# Patient Record
Sex: FEMALE | Race: WHITE | NOT HISPANIC OR LATINO
[De-identification: names, ages, dates, MRNs, and addresses within clinical notes are randomized per-mention and may not be internally consistent; named-entity substitution may affect disease eponyms.]

---

## 2017-05-11 ENCOUNTER — APPOINTMENT (OUTPATIENT)
Dept: OBGYN | Facility: CLINIC | Age: 57
End: 2017-05-11

## 2017-05-11 VITALS
DIASTOLIC BLOOD PRESSURE: 80 MMHG | BODY MASS INDEX: 28.93 KG/M2 | WEIGHT: 180 LBS | HEIGHT: 66 IN | SYSTOLIC BLOOD PRESSURE: 118 MMHG

## 2017-05-11 DIAGNOSIS — Z80.42 FAMILY HISTORY OF MALIGNANT NEOPLASM OF PROSTATE: ICD-10-CM

## 2017-05-11 DIAGNOSIS — Z80.52 FAMILY HISTORY OF MALIGNANT NEOPLASM OF BLADDER: ICD-10-CM

## 2017-05-11 LAB — HEMOCCULT SP1 STL QL: NEGATIVE

## 2017-05-25 ENCOUNTER — RESULT REVIEW (OUTPATIENT)
Age: 57
End: 2017-05-25

## 2018-11-30 ENCOUNTER — RESULT REVIEW (OUTPATIENT)
Age: 58
End: 2018-11-30

## 2018-12-28 ENCOUNTER — APPOINTMENT (OUTPATIENT)
Dept: OBGYN | Facility: CLINIC | Age: 58
End: 2018-12-28
Payer: COMMERCIAL

## 2018-12-28 VITALS
WEIGHT: 178.13 LBS | DIASTOLIC BLOOD PRESSURE: 64 MMHG | BODY MASS INDEX: 28.63 KG/M2 | SYSTOLIC BLOOD PRESSURE: 128 MMHG | HEIGHT: 66 IN | HEART RATE: 80 BPM

## 2018-12-28 DIAGNOSIS — Z01.419 ENCOUNTER FOR GYNECOLOGICAL EXAMINATION (GENERAL) (ROUTINE) W/OUT ABNORMAL FINDINGS: ICD-10-CM

## 2018-12-28 DIAGNOSIS — D25.9 LEIOMYOMA OF UTERUS, UNSPECIFIED: ICD-10-CM

## 2018-12-28 LAB
CARD LOT #: 281
CARD LOT EXP DATE: NORMAL
HEMOCCULT SP1 STL QL: NEGATIVE

## 2018-12-28 PROCEDURE — 82270 OCCULT BLOOD FECES: CPT

## 2018-12-28 PROCEDURE — 99396 PREV VISIT EST AGE 40-64: CPT

## 2019-01-03 ENCOUNTER — APPOINTMENT (OUTPATIENT)
Dept: OBGYN | Facility: CLINIC | Age: 59
End: 2019-01-03
Payer: COMMERCIAL

## 2019-01-03 ENCOUNTER — ASOB RESULT (OUTPATIENT)
Age: 59
End: 2019-01-03

## 2019-01-03 PROCEDURE — 76857 US EXAM PELVIC LIMITED: CPT

## 2019-01-03 PROCEDURE — 76830 TRANSVAGINAL US NON-OB: CPT

## 2020-07-02 ENCOUNTER — APPOINTMENT (OUTPATIENT)
Dept: OBGYN | Facility: CLINIC | Age: 60
End: 2020-07-02
Payer: COMMERCIAL

## 2020-07-02 VITALS
SYSTOLIC BLOOD PRESSURE: 120 MMHG | HEIGHT: 66 IN | DIASTOLIC BLOOD PRESSURE: 66 MMHG | WEIGHT: 180 LBS | BODY MASS INDEX: 28.93 KG/M2

## 2020-07-02 DIAGNOSIS — Z01.419 ENCOUNTER FOR GYNECOLOGICAL EXAMINATION (GENERAL) (ROUTINE) W/OUT ABNORMAL FINDINGS: ICD-10-CM

## 2020-07-02 LAB — HEMOCCULT SP1 STL QL: NEGATIVE

## 2020-07-02 PROCEDURE — 99396 PREV VISIT EST AGE 40-64: CPT

## 2020-07-02 PROCEDURE — 82270 OCCULT BLOOD FECES: CPT

## 2020-07-02 NOTE — HISTORY OF PRESENT ILLNESS
[1 Year Ago] : 1 year ago [Healthy Diet] : a healthy diet [Good] : being in good health [Weight Concerns] : weight concens [___ Servings of Dairy/Day] : [unfilled] servings of dairy foods per day [Regular Exercise] : regular exercise [3 or more times/wk] :  3 or more times per week [Current] : metabolic screening reviewed and current [Last Mammogram ___] : Last Mammogram was [unfilled] [Last Pap ___] : Last cervical pap smear was [unfilled] [Last Bone Density ___] : Last bone density studies [unfilled] [Last Colonoscopy ___] : Last colonoscopy [unfilled] [Performed Within 5 Years] : lipid profile performed within the past five years [Annual Vaginal Sonography ___] : annual vaginal sonograph [unfilled] [Performed Last Year] : thyroid function test performed last year [High LDL] : high LDL cholesterol [Low HDL] : low HDL cholesterol [Obesity] : obesity [Stress] : stress [Tobacco Use] : tobacco use [Osteoporosis Risk Factors] : osteoporosis risk factors [Postmenopausal] : is postmenopausal [Pregnancy History] : pregnancy history: [Total Preg ___] : [unfilled] [AB Induced ___] : elective abortions: [unfilled] [Monogamous (Male Partner)] : is monogamous with a male partner [HPV Vaccine NA Due to Age] : HPV vaccine not available to patient due to age [Dyspareunia] : dyspareunia [Experiencing Menopausal Sxs] : experiencing menopausal symptoms [Currently In Menopause] : currently in menopause [Sexually Active] : is sexually active [Monogamous] : is monogamous [Walking] : walking [Diabetes] : no diabetes [Hypertension] : no hypertension [Illicit Drug Use] : no illicit drug use [Sedentary Lifestyle] : no sedentary lifestyle [FH Cardiovascular Disease] : no family history of cardiovascular disease [Previous Breast Cancer] : no previous breast cancer [Abnormal Cervical Cytology] : no abnormal cervical cytology [HPV Positive] : no positive screening for human papilloma virus [Hormone Therapy] : no hormone therapy [Previous Colon Polyps] : no previous colon polyps [Inflammatory Bowel Disease] : no inflammatory bowel disease [In Utero JOSE ANGEL Exposure] : no diethylstilbestrol (JOSE ANGEL) exposure in utero [de-identified] : menopause early 40s [Hot Flashes] : no hot flashes [Night Sweats] : no night sweats [de-identified] : OCCASIONAL INTERCOURSE [FreeTextEntry8] : SOME VAGINAL DRYNESS, INSOMNIA

## 2020-07-02 NOTE — PHYSICAL EXAM
[Awake] : awake [Alert] : alert [Soft] : soft [Oriented x3] : oriented to person, place, and time [Normal] : cervix [No Bleeding] : there was no active vaginal bleeding [Atrophy] : atrophy [Uterine Adnexae] : were not tender and not enlarged [No Tenderness] : no rectal tenderness [Exam Deferred] : was deferred [RRR, No Murmurs] : RRR, no murmurs [CTAB] : CTAB [Acute Distress] : no acute distress [LAD] : no lymphadenopathy [Goiter] : no goiter [Mass] : no breast mass [Thyroid Nodule] : no thyroid nodule [Nipple Discharge] : no nipple discharge [Axillary LAD] : no axillary lymphadenopathy [Occult Blood] : occult blood test from digital rectal exam was negative [Tender] : non tender

## 2020-07-02 NOTE — REVIEW OF SYSTEMS
[Cold Intolerance] : intolerance to cold [Loss of Hair] : loss of hair [Nl] : Integumentary [Feeling Tired] : feeling tired [Arthralgias] : arthralgias [Joint Stiffness] : joint stiffness [Joint Pain] : joint pain

## 2020-07-02 NOTE — COUNSELING
[Breast Self Exam] : breast self exam [Exercise] : exercise [Nutrition] : nutrition [Vitamins/Supplements] : vitamins/supplements [FreeTextEntry2] : treatment of atrophic vaginitis, need to observe for vaginal bleeding as side effect and fu immediately at office

## 2020-07-08 LAB — HPV HIGH+LOW RISK DNA PNL CVX: NOT DETECTED

## 2020-07-09 LAB — CYTOLOGY CVX/VAG DOC THIN PREP: ABNORMAL

## 2020-07-13 ENCOUNTER — APPOINTMENT (OUTPATIENT)
Dept: OBGYN | Facility: CLINIC | Age: 60
End: 2020-07-13
Payer: COMMERCIAL

## 2020-07-13 PROCEDURE — ZZZZZ: CPT

## 2020-07-15 ENCOUNTER — TRANSCRIPTION ENCOUNTER (OUTPATIENT)
Age: 60
End: 2020-07-15

## 2020-07-22 LAB
CA 125 (LABCORP): 10.3 U/ML
HE 4: 54.7 PMOL/L
POSTMENOPAUSAL ROMA: 0.98
PREMENOPAUSAL ROMA: 0.89
ROMA COMMENT: NORMAL

## 2020-07-24 ENCOUNTER — APPOINTMENT (OUTPATIENT)
Dept: DISASTER EMERGENCY | Facility: CLINIC | Age: 60
End: 2020-07-24

## 2020-07-25 LAB — SARS-COV-2 N GENE NPH QL NAA+PROBE: NOT DETECTED

## 2020-08-13 ENCOUNTER — APPOINTMENT (OUTPATIENT)
Dept: DISASTER EMERGENCY | Facility: CLINIC | Age: 60
End: 2020-08-13

## 2020-08-14 LAB — SARS-COV-2 N GENE NPH QL NAA+PROBE: NOT DETECTED

## 2021-08-16 ENCOUNTER — TRANSCRIPTION ENCOUNTER (OUTPATIENT)
Age: 61
End: 2021-08-16

## 2021-11-22 ENCOUNTER — NON-APPOINTMENT (OUTPATIENT)
Age: 61
End: 2021-11-22

## 2022-07-07 ENCOUNTER — NON-APPOINTMENT (OUTPATIENT)
Age: 62
End: 2022-07-07

## 2022-07-08 ENCOUNTER — APPOINTMENT (OUTPATIENT)
Dept: OBGYN | Facility: CLINIC | Age: 62
End: 2022-07-08

## 2022-09-09 ENCOUNTER — APPOINTMENT (OUTPATIENT)
Dept: OBGYN | Facility: CLINIC | Age: 62
End: 2022-09-09

## 2022-09-09 VITALS
SYSTOLIC BLOOD PRESSURE: 140 MMHG | WEIGHT: 175 LBS | BODY MASS INDEX: 28.12 KG/M2 | HEIGHT: 66 IN | DIASTOLIC BLOOD PRESSURE: 80 MMHG

## 2022-09-09 LAB
BILIRUB UR QL STRIP: NORMAL
DATE COLLECTED: NORMAL
GLUCOSE UR-MCNC: NORMAL
HCG UR QL: 0.2 EU/DL
HEMOCCULT SP1 STL QL: NEGATIVE
HGB UR QL STRIP.AUTO: ABNORMAL
KETONES UR-MCNC: NORMAL
LEUKOCYTE ESTERASE UR QL STRIP: ABNORMAL
NITRITE UR QL STRIP: NORMAL
PH UR STRIP: 5.5
PROT UR STRIP-MCNC: NORMAL
QUALITY CONTROL: YES
SP GR UR STRIP: 1.01

## 2022-09-09 PROCEDURE — 81003 URINALYSIS AUTO W/O SCOPE: CPT | Mod: QW

## 2022-09-09 PROCEDURE — 99396 PREV VISIT EST AGE 40-64: CPT

## 2022-09-09 PROCEDURE — 82270 OCCULT BLOOD FECES: CPT

## 2022-09-09 NOTE — HISTORY OF PRESENT ILLNESS
[Currently Active] : currently active [Men] : men [No] : No [Patient refuses STI testing] : Patient refuses STI testing [FreeTextEntry1] : HERE FOR ANNUAL EXAM\par NO Menstrual Problems: PM\par Contraception: Patient does not use contraception. \par LENGTH OF TIME IN RELATIONSHIP:  exclusive\par NUTRITIONAL INFO: overall well balanced,ca rich food: 2 daily, WEIGHT BEARING Exercise  [Mammogramdate] : 2020 [PapSmeardate] : 2020 [BoneDensityDate] : 2020 [ColonoscopyDate] : 2010

## 2022-09-09 NOTE — DISCUSSION/SUMMARY
[FreeTextEntry1] : The benefits of adequate calcium intake and a daily multivitamin along with routine daily cardiovascular exercise were reviewed with the patient.\par  The patient was informed regarding the benefits of a YEARLY screening FOR SKIN CANCER \par  The importance of safer-sex was discussed with the patient.\par We reviewed ASCCP/ACOG guidelines for pap smears. \par  The patient was informed regarding the benefits of a screening colonoscopy.\par Rectal Exam: no rectal tenderness and occult blood test from digital rectal exam was negative. \par STOOL GUAIAC\par LOT [id number], EXP 10/30/23, DEV. LOT [id number], EXP 01/2025\par ALL QUESTIONS ANSWERED\par DISCUSSED PRECAUTIONS AGAINST COVID19, INCLUDING MASK WEARING, SOCIAL DISTANCING AND HAND WASHING.

## 2022-09-09 NOTE — PHYSICAL EXAM
[Chaperone Present] : A chaperone was present in the examining room during all aspects of the physical examination [FreeTextEntry1] : YULY LINARES  [Appropriately responsive] : appropriately responsive [Alert] : alert [No Acute Distress] : no acute distress [No Lymphadenopathy] : no lymphadenopathy [Regular Rate Rhythm] : regular rate rhythm [No Murmurs] : no murmurs [Clear to Auscultation B/L] : clear to auscultation bilaterally [Soft] : soft [Non-tender] : non-tender [Non-distended] : non-distended [No HSM] : No HSM [No Lesions] : no lesions [No Mass] : no mass [Oriented x3] : oriented x3 [Labia Majora] : normal [Labia Minora] : normal [Atrophy] : atrophy [Normal] : normal [Uterine Adnexae] : normal [No Tenderness] : no tenderness

## 2022-09-12 LAB
C TRACH RRNA SPEC QL NAA+PROBE: NOT DETECTED
HPV HIGH+LOW RISK DNA PNL CVX: NOT DETECTED
N GONORRHOEA RRNA SPEC QL NAA+PROBE: NOT DETECTED
SOURCE TP AMPLIFICATION: NORMAL

## 2022-09-14 LAB — BACTERIA UR CULT: ABNORMAL

## 2022-09-15 LAB — CYTOLOGY CVX/VAG DOC THIN PREP: ABNORMAL

## 2023-01-09 ENCOUNTER — APPOINTMENT (OUTPATIENT)
Dept: OBGYN | Facility: CLINIC | Age: 63
End: 2023-01-09
Payer: MEDICARE

## 2023-01-09 VITALS
SYSTOLIC BLOOD PRESSURE: 132 MMHG | DIASTOLIC BLOOD PRESSURE: 80 MMHG | WEIGHT: 170 LBS | BODY MASS INDEX: 27.32 KG/M2 | HEIGHT: 66 IN

## 2023-01-09 DIAGNOSIS — R35.0 FREQUENCY OF MICTURITION: ICD-10-CM

## 2023-01-09 LAB
BILIRUB UR QL STRIP: NORMAL
GLUCOSE UR-MCNC: NORMAL
HCG UR QL: 0.2 EU/DL
HGB UR QL STRIP.AUTO: NORMAL
KETONES UR-MCNC: ABNORMAL
LEUKOCYTE ESTERASE UR QL STRIP: NORMAL
NITRITE UR QL STRIP: NORMAL
PH UR STRIP: 5.5
PROT UR STRIP-MCNC: NORMAL
SP GR UR STRIP: 1.03

## 2023-01-09 PROCEDURE — 81003 URINALYSIS AUTO W/O SCOPE: CPT | Mod: QW

## 2023-01-09 PROCEDURE — 99213 OFFICE O/P EST LOW 20 MIN: CPT

## 2023-01-09 RX ORDER — NITROFURANTOIN (MONOHYDRATE/MACROCRYSTALS) 25; 75 MG/1; MG/1
100 CAPSULE ORAL TWICE DAILY
Qty: 10 | Refills: 0 | Status: DISCONTINUED | COMMUNITY
Start: 2022-09-09 | End: 2023-01-09

## 2023-01-09 RX ORDER — FLUCONAZOLE 150 MG/1
150 TABLET ORAL
Qty: 2 | Refills: 0 | Status: DISCONTINUED | COMMUNITY
Start: 2022-09-09 | End: 2023-01-09

## 2023-01-09 NOTE — HISTORY OF PRESENT ILLNESS
[Gonorrhea test offered] : Gonorrhea test offered [Chlamydia test offered] : Chlamydia test offered [HPV test offered] : HPV test offered [LMP unknown] : LMP unknown [N] : Patient is not sexually active [Y] : Positive pregnancy history [unknown] : Patient is unsure of the date of her LMP [Menarche Age: ____] : age at menarche was [unfilled] [Previously active] : previously active [Men] : men [FreeTextEntry1] : 63 y/o postmenopausal (LMP 2005) woman presenting for possible UTI. Had back surgery in July, had catheter in, got UTI 3 weeks later, first time ever. Was treated in September, now she feels symptoms are back. She specifically experiences frequency, and mild dysuria. No other complaints. Pt took 's nitrofurantoin BID for 3 days a week ago with no relief. Pt reports she is not currently sexually active because sex has been painful for years since becoming menopausal. reports she's tried oils and lubrication with no help, has never tried vaginal estrogen. [Papeardate] : 09/09/2022 [TextBox_31] : Negative [GonorrheaDate] : 09/09/2022 [TextBox_63] : Negative [ChlamydiaDate] : 09/09/2022 [TextBox_68] : Negative [HPVDate] : 09/09/2022 [TextBox_78] : Negative [PGxTotal] : 1 [PGHxAbortions] : 1 [[facility] CenterxLiving] : 0

## 2023-01-09 NOTE — DISCUSSION/SUMMARY
[FreeTextEntry1] : 63 y/o postmenopausal female with likely UTI\par - Urine culture sent to confirm UTI and check sensitivities\par - Bactrim DS BID for 3 days prescribed\par - Counseled patient that in context of pain with intercourse and atrophic smear pattern on last pap, possible that vaginal atrophy is the cause of her symptoms. Advised that there is some evidence that this condition can increase frequency of UTIs as well. Advised that if UTI occurs again after this episode, may want to consider vaginal estrogen therapy as it may help. Similarly offered to perform a vaginal exam today and potentially start vaginal E2 if atrophy present just for pain with intercourse symptoms and pt declined, will wait to see if UTI comes back.\par - Return for routine visits or as needed, consider vaginal estrogen if UTI reoccurs\par \par Soren Resendez MD

## 2023-01-11 ENCOUNTER — NON-APPOINTMENT (OUTPATIENT)
Age: 63
End: 2023-01-11

## 2023-01-11 LAB — BACTERIA UR CULT: NORMAL

## 2023-02-02 ENCOUNTER — APPOINTMENT (OUTPATIENT)
Dept: ORTHOPEDIC SURGERY | Facility: CLINIC | Age: 63
End: 2023-02-02

## 2023-03-20 ENCOUNTER — EMERGENCY (EMERGENCY)
Facility: HOSPITAL | Age: 63
LOS: 1 days | Discharge: HOME | End: 2023-03-20
Attending: EMERGENCY MEDICINE
Payer: MEDICARE

## 2023-03-20 VITALS
RESPIRATION RATE: 20 BRPM | OXYGEN SATURATION: 97 % | HEIGHT: 66 IN | SYSTOLIC BLOOD PRESSURE: 131 MMHG | HEART RATE: 98 BPM | TEMPERATURE: 98 F | DIASTOLIC BLOOD PRESSURE: 69 MMHG | WEIGHT: 175.05 LBS

## 2023-03-20 DIAGNOSIS — Z98.89 OTHER SPECIFIED POSTPROCEDURAL STATES: Chronic | ICD-10-CM

## 2023-03-20 DIAGNOSIS — K61.0 ANAL ABSCESS: Chronic | ICD-10-CM

## 2023-03-20 LAB
ALBUMIN SERPL ELPH-MCNC: 4 G/DL — SIGNIFICANT CHANGE UP (ref 3.3–5.2)
ALP SERPL-CCNC: 60 U/L — SIGNIFICANT CHANGE UP (ref 40–120)
ALT FLD-CCNC: 17 U/L — SIGNIFICANT CHANGE UP
ANION GAP SERPL CALC-SCNC: 11 MMOL/L — SIGNIFICANT CHANGE UP (ref 5–17)
APTT BLD: 28.2 SEC — SIGNIFICANT CHANGE UP (ref 27.5–35.5)
AST SERPL-CCNC: 14 U/L — SIGNIFICANT CHANGE UP
BASOPHILS # BLD AUTO: 0.05 K/UL — SIGNIFICANT CHANGE UP (ref 0–0.2)
BASOPHILS NFR BLD AUTO: 0.5 % — SIGNIFICANT CHANGE UP (ref 0–2)
BILIRUB SERPL-MCNC: 0.8 MG/DL — SIGNIFICANT CHANGE UP (ref 0.4–2)
BLD GP AB SCN SERPL QL: SIGNIFICANT CHANGE UP
BUN SERPL-MCNC: 12.8 MG/DL — SIGNIFICANT CHANGE UP (ref 8–20)
CALCIUM SERPL-MCNC: 9 MG/DL — SIGNIFICANT CHANGE UP (ref 8.4–10.5)
CHLORIDE SERPL-SCNC: 101 MMOL/L — SIGNIFICANT CHANGE UP (ref 96–108)
CO2 SERPL-SCNC: 26 MMOL/L — SIGNIFICANT CHANGE UP (ref 22–29)
CREAT SERPL-MCNC: 0.74 MG/DL — SIGNIFICANT CHANGE UP (ref 0.5–1.3)
EGFR: 91 ML/MIN/1.73M2 — SIGNIFICANT CHANGE UP
EOSINOPHIL # BLD AUTO: 0.16 K/UL — SIGNIFICANT CHANGE UP (ref 0–0.5)
EOSINOPHIL NFR BLD AUTO: 1.7 % — SIGNIFICANT CHANGE UP (ref 0–6)
FLUAV AG NPH QL: SIGNIFICANT CHANGE UP
FLUBV AG NPH QL: SIGNIFICANT CHANGE UP
GLUCOSE SERPL-MCNC: 91 MG/DL — SIGNIFICANT CHANGE UP (ref 70–99)
HCT VFR BLD CALC: 39.1 % — SIGNIFICANT CHANGE UP (ref 34.5–45)
HGB BLD-MCNC: 13.7 G/DL — SIGNIFICANT CHANGE UP (ref 11.5–15.5)
IMM GRANULOCYTES NFR BLD AUTO: 0.4 % — SIGNIFICANT CHANGE UP (ref 0–0.9)
INR BLD: 1.14 RATIO — SIGNIFICANT CHANGE UP (ref 0.88–1.16)
LYMPHOCYTES # BLD AUTO: 3.03 K/UL — SIGNIFICANT CHANGE UP (ref 1–3.3)
LYMPHOCYTES # BLD AUTO: 32 % — SIGNIFICANT CHANGE UP (ref 13–44)
MCHC RBC-ENTMCNC: 32.5 PG — SIGNIFICANT CHANGE UP (ref 27–34)
MCHC RBC-ENTMCNC: 35 GM/DL — SIGNIFICANT CHANGE UP (ref 32–36)
MCV RBC AUTO: 92.9 FL — SIGNIFICANT CHANGE UP (ref 80–100)
MONOCYTES # BLD AUTO: 0.76 K/UL — SIGNIFICANT CHANGE UP (ref 0–0.9)
MONOCYTES NFR BLD AUTO: 8 % — SIGNIFICANT CHANGE UP (ref 2–14)
NEUTROPHILS # BLD AUTO: 5.43 K/UL — SIGNIFICANT CHANGE UP (ref 1.8–7.4)
NEUTROPHILS NFR BLD AUTO: 57.4 % — SIGNIFICANT CHANGE UP (ref 43–77)
PLATELET # BLD AUTO: 224 K/UL — SIGNIFICANT CHANGE UP (ref 150–400)
POTASSIUM SERPL-MCNC: 3.3 MMOL/L — LOW (ref 3.5–5.3)
POTASSIUM SERPL-SCNC: 3.3 MMOL/L — LOW (ref 3.5–5.3)
PROT SERPL-MCNC: 6.7 G/DL — SIGNIFICANT CHANGE UP (ref 6.6–8.7)
PROTHROM AB SERPL-ACNC: 13.3 SEC — SIGNIFICANT CHANGE UP (ref 10.5–13.4)
RBC # BLD: 4.21 M/UL — SIGNIFICANT CHANGE UP (ref 3.8–5.2)
RBC # FLD: 12.9 % — SIGNIFICANT CHANGE UP (ref 10.3–14.5)
RSV RNA NPH QL NAA+NON-PROBE: SIGNIFICANT CHANGE UP
SARS-COV-2 RNA SPEC QL NAA+PROBE: SIGNIFICANT CHANGE UP
SODIUM SERPL-SCNC: 138 MMOL/L — SIGNIFICANT CHANGE UP (ref 135–145)
TROPONIN T SERPL-MCNC: <0.01 NG/ML — SIGNIFICANT CHANGE UP (ref 0–0.06)
TSH SERPL-MCNC: 1.66 UIU/ML — SIGNIFICANT CHANGE UP (ref 0.27–4.2)
WBC # BLD: 9.47 K/UL — SIGNIFICANT CHANGE UP (ref 3.8–10.5)
WBC # FLD AUTO: 9.47 K/UL — SIGNIFICANT CHANGE UP (ref 3.8–10.5)

## 2023-03-20 PROCEDURE — 0042T: CPT | Mod: MA

## 2023-03-20 PROCEDURE — 70496 CT ANGIOGRAPHY HEAD: CPT | Mod: 26,MA

## 2023-03-20 PROCEDURE — 70498 CT ANGIOGRAPHY NECK: CPT | Mod: 26,MA

## 2023-03-20 PROCEDURE — 99291 CRITICAL CARE FIRST HOUR: CPT

## 2023-03-20 RX ORDER — LEVOTHYROXINE SODIUM 125 MCG
125 TABLET ORAL DAILY
Refills: 0 | Status: DISCONTINUED | OUTPATIENT
Start: 2023-03-20 | End: 2023-03-28

## 2023-03-20 RX ORDER — GABAPENTIN 400 MG/1
300 CAPSULE ORAL AT BEDTIME
Refills: 0 | Status: DISCONTINUED | OUTPATIENT
Start: 2023-03-20 | End: 2023-03-28

## 2023-03-20 RX ORDER — GABAPENTIN 400 MG/1
100 CAPSULE ORAL DAILY
Refills: 0 | Status: DISCONTINUED | OUTPATIENT
Start: 2023-03-20 | End: 2023-03-28

## 2023-03-20 RX ADMIN — GABAPENTIN 300 MILLIGRAM(S): 400 CAPSULE ORAL at 22:04

## 2023-03-20 NOTE — ED ADULT NURSE NOTE - OBJECTIVE STATEMENT
pt A&Ox4, states that around 1530 she had acute visual changes stating "it looked like abstract art" and head pressure that last about 15mins and resolved, pt went to Urgent care and was sent to ED, resp even and unlabored no distress noted, abd soft NTND, pt ambulated without difficulty, denies numbness/tingling, dizziness, chest pain, sob, abd pain , n/v/d

## 2023-03-20 NOTE — ED ADULT TRIAGE NOTE - CHIEF COMPLAINT QUOTE
Pt states she had an acute vision loss at home approx 1530 this afternoon.  Pt felt unable to focus and experienced episode of dizziness.  PMD referred pt to ED to r/o TIA.  Denies numbness, tingling.  Strength equal bilaterally.

## 2023-03-20 NOTE — ED PROVIDER NOTE - NSICDXFAMILYHX_GEN_ALL_CORE_FT
FAMILY HISTORY:  Father  Still living? No  Family history of prostate cancer, Age at diagnosis: 71-80    Mother  Still living? No  Family history of uterine cancer, Age at diagnosis: 81-90

## 2023-03-20 NOTE — ED ADULT NURSE NOTE - NS ED NURSE IV DC DT
Telephone Encounter by Jacy Dye at 03/15/17 12:49 PM     Author:  Jacy Dye Service:  (none) Author Type:       Filed:  03/15/17 12:49 PM Encounter Date:  3/13/2017 Status:  Signed     :  Jacy Dye ()            Left message on answering machine to call back.[AK1.1T] Please schedule 1st available NPT with Dr. Claudio for colon polyp, possible EGD[AK1.1M]      Revision History        User Key Date/Time User Provider Type Action    > AK1.1 03/15/17 12:49 PM Jacy Dye  Sign    M - Manual, T - Template             21-Mar-2023 09:57

## 2023-03-20 NOTE — ED CDU PROVIDER INITIAL DAY NOTE - NS ED ROS FT
ROS: CONTUSIONAL: Denies fever, chills, fatigue, wt loss. HEAD: Denies trauma, HA, Dizziness. EYE: Acute visual changes, Denies  diplopia. ENMT: Denies change in hearing, tinnitus, epistaxis, difficulty swallowing, sore throat. CARDIO: Denies CP, palpitations, edema. RESP: Denies Cough, SOB , Diff breathing, hemoptysis. GI: Denies N/V, ABD pain, change in bowel movement. URINARY: Denies difficulty urinating, pelvic pain. MS:  Denies joint pain, back pain, weakness, decreased ROM, swelling. NEURO: Denies change in gait, seizures, loss of sensation, dizziness, confusion LOC.  PSY: NO SI/HI. SKIN: Denies Rash, bruising.

## 2023-03-20 NOTE — ED PROVIDER NOTE - CLINICAL SUMMARY MEDICAL DECISION MAKING FREE TEXT BOX
62 year old female with hx of hypothyroidism, hyperlipidemia, and former smoker who presents as code stroke. Symptoms lasted 15 minutes before spontaneously resolving prior to ED arrival. NIHSS of 0. CT imaging w area of ?artifact on perfusion study, otherwise unremarkable. Labs with hypokalemia (3.3). Given risk factors and TIA like event today, patient encouraged to stay for MRI/neurology eval

## 2023-03-20 NOTE — ED CDU PROVIDER INITIAL DAY NOTE - PHYSICAL EXAMINATION
Gen: Well appearing in NAD  Head: NC/AT  Neck: trachea midline  Resp:  No distress  Ext: no deformities  Neuro: A&O x 3, CN II-Xii GI, MAEx 4,  5/5/ motors, = sensation, no pronator drift or ataxia.   Skin:  Warm and dry as visualized  Psych: Calm, cooperative

## 2023-03-20 NOTE — ED CDU PROVIDER INITIAL DAY NOTE - ATTENDING APP SHARED VISIT CONTRIBUTION OF CARE
62 yof pmh hld with resolved episode of vision change around 3p. Resolved after 15 mins. Described as halo like blur in left visual field in both eyes. NIH 0 here. CT imaging in Ed negative. placed in obs for MR

## 2023-03-20 NOTE — ED CDU PROVIDER INITIAL DAY NOTE - OBJECTIVE STATEMENT
62 year old female with hx of hypothyroidism, hyperlipidemia, and former smoker who presented as ode stroke.   Patient states that at 15:30 she was watching TV when everything on the screen became fuzzy/unfocused. She tried closing each eye, one at a time, without improvement. Also felt like her head was "heavy." At present, her vision is normal. Has a lot of nervousness but otherwise feels well. Saw a cardiologist 30 years for "extra beat," has not seen one since. Quit smoking after a back surgery 9 months ago. Pt admitted to HA that has since resolved, Pt dines numbness, tingling weakness, loss of sensation, fever, chills.

## 2023-03-20 NOTE — ED CDU PROVIDER INITIAL DAY NOTE - NS ED ATTENDING STATEMENT MOD
This was a shared visit with the ELIO. I reviewed and verified the documentation and independently performed the documented:

## 2023-03-20 NOTE — ED PROVIDER NOTE - ATTENDING CONTRIBUTION TO CARE
I, Umu Gutierrez DO, have personally provided 45 minutes of critical care time exclusive of time spent on separately billable procedures. Time includes review of laboratory data, radiology results, discussion with consultants, and monitoring for potential decompensation. Interventions were performed as documented above.     I personally saw the patient with the resident, and completed the key components of the history and physical exam. I then discussed the management plan with the resident.    61 y/o F with PMH hyperlipidemia, former smoker, hypothyroidism presents for 15 minutes of central vision change, irrespective of which eye was closed, with head pressure that resolved. No other symptoms.    PE - NIH 0, RRR, no respiratory distress, abd soft NT/ND, ambulates steadily, A&O x 4.    Stroke work up - patient initially does not want to stay for MRI, discussed risks of leaving AMA, patient agreeable to stay.

## 2023-03-20 NOTE — ED CDU PROVIDER INITIAL DAY NOTE - CLINICAL SUMMARY MEDICAL DECISION MAKING FREE TEXT BOX
PT with  resolved neurological symptoms   placed in obs for Diagnostic uncertainty. PT seen BY OBS PA found to be appropriate CDU PT care transferred to PA.

## 2023-03-20 NOTE — ED ADULT NURSE REASSESSMENT NOTE - NSIMPLEMENTINTERV_GEN_ALL_ED
Implemented All Universal Safety Interventions:  [place] to call system. Call bell, personal items and telephone within reach. Instruct patient to call for assistance. Room bathroom lighting operational. Non-slip footwear when patient is off stretcher. Physically safe environment: no spills, clutter or unnecessary equipment. Stretcher in lowest position, wheels locked, appropriate side rails in place.

## 2023-03-20 NOTE — ED PROVIDER NOTE - PHYSICAL EXAMINATION
Gen: Well appearing in NAD  Head: NC/AT  Neck: trachea midline  Resp:  No distress  Ext: no deformities  Neuro:  A&O , see NIHSS   Skin:  Warm and dry as visualized  Psych: Calm, cooperative

## 2023-03-20 NOTE — ED PROVIDER NOTE - OBJECTIVE STATEMENT
62 year old female with hx of hypothyroidism, hyperlipidemia who presents   Patient states that at 15:30 she was watching TV when   At present, her vision is normal. Has a lot of nervousness but otherwise feels well. Saw a cardiologist 30 years for "extra beat," has not seen one since. 62 year old female with hx of hypothyroidism, hyperlipidemia, and former smoker who presents as code stroke.   Patient states that at 15:30 she was watching TV when everything on the screen became fuzzy/unfocused. She tried closing each eye, one at a time, without improvement. Also felt like her head was "heavy." At present, her vision is normal. Has a lot of nervousness but otherwise feels well. Saw a cardiologist 30 years for "extra beat," has not seen one since. Quit smoking after a back surgery 9 months ago.

## 2023-03-21 VITALS
OXYGEN SATURATION: 99 % | TEMPERATURE: 98 F | SYSTOLIC BLOOD PRESSURE: 115 MMHG | HEART RATE: 74 BPM | DIASTOLIC BLOOD PRESSURE: 73 MMHG | RESPIRATION RATE: 16 BRPM

## 2023-03-21 PROCEDURE — 86900 BLOOD TYPING SEROLOGIC ABO: CPT

## 2023-03-21 PROCEDURE — 84484 ASSAY OF TROPONIN QUANT: CPT

## 2023-03-21 PROCEDURE — 82962 GLUCOSE BLOOD TEST: CPT

## 2023-03-21 PROCEDURE — 0042T: CPT | Mod: MA

## 2023-03-21 PROCEDURE — 70496 CT ANGIOGRAPHY HEAD: CPT | Mod: MA

## 2023-03-21 PROCEDURE — 70498 CT ANGIOGRAPHY NECK: CPT | Mod: MA

## 2023-03-21 PROCEDURE — 36415 COLL VENOUS BLD VENIPUNCTURE: CPT

## 2023-03-21 PROCEDURE — 85025 COMPLETE CBC W/AUTO DIFF WBC: CPT

## 2023-03-21 PROCEDURE — 99291 CRITICAL CARE FIRST HOUR: CPT | Mod: 25

## 2023-03-21 PROCEDURE — 70450 CT HEAD/BRAIN W/O DYE: CPT | Mod: MA

## 2023-03-21 PROCEDURE — 86901 BLOOD TYPING SEROLOGIC RH(D): CPT

## 2023-03-21 PROCEDURE — 93005 ELECTROCARDIOGRAM TRACING: CPT

## 2023-03-21 PROCEDURE — 70551 MRI BRAIN STEM W/O DYE: CPT | Mod: 26,MA

## 2023-03-21 PROCEDURE — 93010 ELECTROCARDIOGRAM REPORT: CPT

## 2023-03-21 PROCEDURE — 86850 RBC ANTIBODY SCREEN: CPT

## 2023-03-21 PROCEDURE — 99238 HOSP IP/OBS DSCHRG MGMT 30/<: CPT

## 2023-03-21 PROCEDURE — 84443 ASSAY THYROID STIM HORMONE: CPT

## 2023-03-21 PROCEDURE — 85610 PROTHROMBIN TIME: CPT

## 2023-03-21 PROCEDURE — 80053 COMPREHEN METABOLIC PANEL: CPT

## 2023-03-21 PROCEDURE — 70551 MRI BRAIN STEM W/O DYE: CPT | Mod: MA

## 2023-03-21 PROCEDURE — 85730 THROMBOPLASTIN TIME PARTIAL: CPT

## 2023-03-21 PROCEDURE — 87637 SARSCOV2&INF A&B&RSV AMP PRB: CPT

## 2023-03-21 PROCEDURE — G0378: CPT

## 2023-03-21 RX ORDER — ACETAMINOPHEN 500 MG
650 TABLET ORAL EVERY 6 HOURS
Refills: 0 | Status: DISCONTINUED | OUTPATIENT
Start: 2023-03-21 | End: 2023-03-28

## 2023-03-21 RX ORDER — OXYMETAZOLINE HYDROCHLORIDE 0.5 MG/ML
2 SPRAY NASAL
Refills: 0 | Status: DISCONTINUED | OUTPATIENT
Start: 2023-03-21 | End: 2023-03-28

## 2023-03-21 RX ORDER — DIAZEPAM 5 MG
5 TABLET ORAL ONCE
Refills: 0 | Status: DISCONTINUED | OUTPATIENT
Start: 2023-03-21 | End: 2023-03-21

## 2023-03-21 RX ADMIN — Medication 5 MILLIGRAM(S): at 06:35

## 2023-03-21 RX ADMIN — Medication 650 MILLIGRAM(S): at 02:19

## 2023-03-21 RX ADMIN — Medication 650 MILLIGRAM(S): at 03:19

## 2023-03-21 RX ADMIN — OXYMETAZOLINE HYDROCHLORIDE 2 SPRAY(S): 0.5 SPRAY NASAL at 02:20

## 2023-03-21 RX ADMIN — Medication 125 MICROGRAM(S): at 05:07

## 2023-03-21 NOTE — ED CDU PROVIDER DISPOSITION NOTE - CARE PROVIDERS DIRECT ADDRESSES
[email or web address]

## 2023-03-21 NOTE — ED ADULT NURSE REASSESSMENT NOTE - NS ED NURSE REASSESS COMMENT FT1
pt resting comfortably on stretcher with eyes closed. nad, rounds frequently provided for pt comfort and safety. neuro check preformed as per order, please refer to flowsheet. pt does not offer acute complaints. awaiting MRI.
pt received from [facility] at [facility]. no s/s of acute distress, pt resting comfortably on stretcher. pt denies chest pain/pressure/tightness, palpitations, sob/dyspnea, dizziness/headache/lightheadedness/blurry vision vision changes, tingling/numbness, fevers/chills, n/v/d. neuro check preformed upon receiving pt. awaiting MRI. plan of care reviewed with pt. pt in understanding of plan of care.
pt resting comfortably on stretcher with eyes closed. nad, rounds frequently provided for pt comfort and safety. neuro check preformed as per orders, please refer to flowsheet. does not offer acute complaints. awaiting MRI.
Assumed care of the patient @0730. Pt A&Ox4, VSS afebrile. Pt denies any c/p dizziness. Patient in understanding of plan of care. Patient with no further questions for the RN. Resting in comfort. Call bell within reach and encouraged to use when assistance needed. Will continue to monitor.

## 2023-03-21 NOTE — ED CDU PROVIDER SUBSEQUENT DAY NOTE - HISTORY
PT placed in OBS, has had no acute incidents or complaints while in CDU PT is stable. PT Placed in OBS for  neurological symptoms that since resolved placed in obs for MR.

## 2023-03-21 NOTE — ED CDU PROVIDER DISPOSITION NOTE - PATIENT PORTAL LINK FT
You can access the FollowMyHealth Patient Portal offered by [facility] by registering at the following website: [email or web address]. By joining [facility]’s FollowMyHealth portal, you will also be able to view your health information using other applications (apps) compatible with our system.

## 2023-03-21 NOTE — ED CDU PROVIDER DISPOSITION NOTE - ATTENDING CONTRIBUTION TO CARE
Placed on observation for extended evaluation of  transient binocular peripheral vision loss that resolved on its own.  Reports "pressure" of right forehead during the episode.  Imaging results reviewed and unremarkable.  A/P: Peripheral vision abnormality, possible ocular migraine.  Recommend urgent follow-up with ophthalmology for dilated funduscopic examination

## 2023-03-21 NOTE — ED CDU PROVIDER DISPOSITION NOTE - NSFOLLOWUPINSTRUCTIONS_ED_ALL_ED_FT
Please follow up with opthalmology and neurology as outpatient    Return if symptoms worsen or persist

## 2023-03-21 NOTE — ED CDU PROVIDER DISPOSITION NOTE - CLINICAL COURSE
62 year old female with hx of hypothyroidism, hyperlipidemia, and former smoker who presented as ode stroke.   Patient states that at 15:30 she was watching TV when everything on the screen became fuzzy/unfocused. She tried closing each eye, one at a time, without improvement. Also felt like her head was "heavy." At present, her vision is normal. Has a lot of nervousness but otherwise feels well. Saw a cardiologist 30 years for "extra beat," has not seen one since. Quit smoking after a back surgery 9 months ago. Pt admitted to HA that has since resolved, Pt dines numbness, tingling weakness, loss of sensation, fever, chills.  Patient placed into observation for MRI.  MRI revealed no infarct.  Given copies of all results.  Will advise f/u with opthalmology and neurology.  Given copies of all results, understands and agrees to proceed.

## 2023-03-21 NOTE — ED CDU PROVIDER SUBSEQUENT DAY NOTE - ATTENDING APP SHARED VISIT CONTRIBUTION OF CARE
Seen on  morning rounds: Reports no complaints at present.  Placed on observation for extended evaluation of  transient peripheral vision loss that resolved on its own.  Patient reports that she was looking at her phone and part of it "looked funny" describes a [place] in the center of her left vision that was "pixelated" and distorted.  Reports symptoms present in both eyes.  episode lasted approximately 15 minutes.  Denies other symptoms associated with peripheral vision abnormality.  Denies flashes.  Denies floaters.    Wears reading glasses. Reports "pressure" of right forehead during the episode.  No symptoms at present.  Physical exam: Nontoxic-appearing, no acute distress, pupils 4 mm and reactive, EOMI, no gross visual field abnormalities, no sinus percussion tenderness, normal coordination.  Imaging results reviewed and unremarkable.  A/P: Peripheral vision abnormality, possible ocular migraine.  Advised urgent follow-up with ophthalmology for dilated funduscopic examination

## 2023-03-21 NOTE — ED ADULT NURSE REASSESSMENT NOTE - BEFAST SCREENING
Case History:  Link Vela was seen today for audiometric evaluation and was referred to the Audiology Department by Dr. Carlos Knight. Link Vela is here today for a follow up of a left ear q-tip injury. She is concern about his hearing.     Audiometric Evaluation:  Pure-tone testing indicated normal hearing bilaterally.    Speech reception thresholds were obtained at 5 dBHL for the right ear and 5 dBHL for the left ear.     Recommendations:  The following recommendations were made: Link was referred back to Dr. Knight for medical recommendations.     Electronically Signed by:  Seema Tracey  1/27/2020 4:50 PM   Doctor of Audiology           Positive

## 2023-03-21 NOTE — ED CDU PROVIDER DISPOSITION NOTE - CARE PROVIDER_API CALL
Everette Mcdonald)  Ophthalmology  [address] [facility], [address] [place] [address]  Phone: [phone]  Fax: [phone]  Follow Up Time:     Kartik Wall)  Neurology  [address] [facility] [facility] [address]  [place] [address]  Phone: [phone]  Fax: [phone]  Follow Up Time:

## 2023-03-22 ENCOUNTER — APPOINTMENT (OUTPATIENT)
Dept: NEUROLOGY | Facility: CLINIC | Age: 63
End: 2023-03-22
Payer: MEDICARE

## 2023-03-22 VITALS
BODY MASS INDEX: 28.93 KG/M2 | WEIGHT: 180 LBS | DIASTOLIC BLOOD PRESSURE: 80 MMHG | HEIGHT: 66 IN | SYSTOLIC BLOOD PRESSURE: 130 MMHG

## 2023-03-22 DIAGNOSIS — Z82.3 FAMILY HISTORY OF STROKE: ICD-10-CM

## 2023-03-22 DIAGNOSIS — F17.200 NICOTINE DEPENDENCE, UNSPECIFIED, UNCOMPLICATED: ICD-10-CM

## 2023-03-22 DIAGNOSIS — Z87.891 PERSONAL HISTORY OF NICOTINE DEPENDENCE: ICD-10-CM

## 2023-03-22 DIAGNOSIS — E78.5 HYPERLIPIDEMIA, UNSPECIFIED: ICD-10-CM

## 2023-03-22 PROCEDURE — 99205 OFFICE O/P NEW HI 60 MIN: CPT

## 2023-03-22 RX ORDER — GABAPENTIN 100 MG/1
100 CAPSULE ORAL
Refills: 0 | Status: ACTIVE | COMMUNITY

## 2023-03-22 RX ORDER — SULFAMETHOXAZOLE AND TRIMETHOPRIM 800; 160 MG/1; MG/1
800-160 TABLET ORAL TWICE DAILY
Qty: 6 | Refills: 0 | Status: COMPLETED | COMMUNITY
Start: 2023-01-09 | End: 2023-03-22

## 2023-03-22 NOTE — ASSESSMENT
[FreeTextEntry1] : This is a 62-year-old woman with hyperlipidemia not previously on statin and a former smoker who had a 15-minute episode of visual changes in the left visual field.  My concern is for TIA.  She had a CT angiogram of the head and neck which did not show any significant stenoses.  Additionally an MRI of her brain did not show any stroke.  At this time she should continue aspirin, continue her Lipitor and titrate statin to an LDL of under 70.  She should maintain normotension and normoglycemia.  I would recommend a cardiology evaluation.  She will need to have extended heart monitoring to rule out any occult atrial fibrillation or other arrhythmia as well as a AL to evaluate for cardioembolic source of this event.  I will see her back in 3 months, sooner should the need arise.

## 2023-03-22 NOTE — PHYSICAL EXAM
[General Appearance - Alert] : alert [General Appearance - In No Acute Distress] : in no acute distress [General Appearance - Well Nourished] : well nourished [General Appearance - Well Developed] : well developed [Person] : oriented to person [Place] : oriented to place [Time] : oriented to time [Remote Intact] : remote memory intact [Registration Intact] : recent registration memory intact [Span Intact] : the attention span was normal [Concentration Intact] : normal concentrating ability [Visual Intact] : visual attention was ~T not ~L decreased [Naming Objects] : no difficulty naming common objects [Repeating Phrases] : no difficulty repeating a phrase [Fluency] : fluency intact [Comprehension] : comprehension intact [Current Events] : adequate knowledge of current events [Past History] : adequate knowledge of personal past history [Cranial Nerves Optic (II)] : visual acuity intact bilaterally,  visual fields full to confrontation, pupils equal round and reactive to light [Cranial Nerves Oculomotor (III)] : extraocular motion intact [Cranial Nerves Trigeminal (V)] : facial sensation intact symmetrically [Cranial Nerves Facial (VII)] : face symmetrical [Cranial Nerves Vestibulocochlear (VIII)] : hearing was intact bilaterally [Cranial Nerves Glossopharyngeal (IX)] : tongue and palate midline [Cranial Nerves Accessory (XI - Cranial And Spinal)] : head turning and shoulder shrug symmetric [Cranial Nerves Hypoglossal (XII)] : there was no tongue deviation with protrusion [Motor Tone] : muscle tone was normal in all four extremities [Motor Strength] : muscle strength was normal in all four extremities [Involuntary Movements] : no involuntary movements were seen [No Muscle Atrophy] : normal bulk in all four extremities [Paresis Pronator Drift Right-Sided] : no pronator drift on the right [Paresis Pronator Drift Left-Sided] : no pronator drift on the left [Motor Strength Upper Extremities Bilaterally] : strength was normal in both upper extremities [Motor Strength Lower Extremities Bilaterally] : strength was normal in both lower extremities [Sensation Tactile Decrease] : light touch was intact [Sensation Pain / Temperature Decrease] : pain and temperature was intact [Sensation Vibration Decrease] : vibration was intact [Proprioception] : proprioception was intact [Abnormal Walk] : normal gait [Balance] : balance was intact [Tremor] : no tremor present [Coordination - Dysmetria Impaired Finger-to-Nose Bilateral] : not present [1+] : Patella right 1+ [2+] : Patella left 2+ [FreeTextEntry6] : Mild pain limited weakness in the right leg [Sclera] : the sclera and conjunctiva were normal [PERRL With Normal Accommodation] : pupils were equal in size, round, reactive to light, with normal accommodation [Extraocular Movements] : extraocular movements were intact [No APD] : no afferent pupillary defect [No SHELBY] : no internuclear ophthalmoplegia [Full Visual Field] : full visual field

## 2023-03-22 NOTE — CONSULT LETTER
[Dear  ___] : Dear  [unfilled], [Consult Letter:] : I had the pleasure of evaluating your patient, [unfilled]. [Please see my note below.] : Please see my note below. [Consult Closing:] : Thank you very much for allowing me to participate in the care of this patient.  If you have any questions, please do not hesitate to contact me. [Sincerely,] : Sincerely, [FreeTextEntry3] : Shant Arteaga M.D., Ph.D. DPN-N\par [facility]\par Neurology at [place]\par Medical Director of Stroke Services\par [facility]\par

## 2023-03-22 NOTE — HISTORY OF PRESENT ILLNESS
[FreeTextEntry1] : Initial office visit March 22, 2023:\par This is a 62-year-old woman who presents today with recent history of left sided visual disturbance.  She states 2 days ago in the mid afternoon she developed a blurring pixelated vision in the left visual field.  It seems to have affected both eyes.  She states what she was reading became blurry and then not the television and anything else she was looking at as well.  She states it was seen in a cervical in the middle of the left visual field.  She had no other neurologic symptoms at the time including but not limited to speech-language deficits, motor deficits, sensory deficits in any of her limbs.  The episode lasted about 15 minutes.  She went to the emergency room where a CT of her head was done which was negative for acute intracranial pathology.  She had a CTA of the head and neck as well as CT perfusion scans done which were both normal.  MRI of her brain was also done which showed small vessel changes without any acute stroke.  She was discharged for follow-up.  She is here today for neurologic evaluation and treatment.

## 2023-03-22 NOTE — DATA REVIEWED
[de-identified] : MRI of the brain was done this week and images reviewed.  It did not show acute stroke mass or bleed.  There were scattered small vessel ischemic changes present. [de-identified] : CT of her head was done this week and did not show acute stroke mass or bleed, CT angiogram of her head and neck were done and did not show any large vessel occlusion, critical stenosis, aneurysm or AVM.  CT perfusion did not show measurable penumbra.

## 2023-06-16 ENCOUNTER — APPOINTMENT (OUTPATIENT)
Dept: NEUROLOGY | Facility: CLINIC | Age: 63
End: 2023-06-16
Payer: MEDICARE

## 2023-06-16 DIAGNOSIS — G45.9 TRANSIENT CEREBRAL ISCHEMIC ATTACK, UNSPECIFIED: ICD-10-CM

## 2023-06-16 PROCEDURE — 99213 OFFICE O/P EST LOW 20 MIN: CPT

## 2023-06-16 RX ORDER — ASPIRIN 81 MG
81 TABLET, DELAYED RELEASE (ENTERIC COATED) ORAL
Refills: 0 | Status: ACTIVE | COMMUNITY

## 2023-06-16 NOTE — HISTORY OF PRESENT ILLNESS
[FreeTextEntry1] : Initial office visit March 22, 2023:\par This is a 62-year-old woman who presents today with recent history of left sided visual disturbance.  She states 2 days ago in the mid afternoon she developed a blurring pixelated vision in the left visual field.  It seems to have affected both eyes.  She states what she was reading became blurry and then not the television and anything else she was looking at as well.  She states it was seen in a cervical in the middle of the left visual field.  She had no other neurologic symptoms at the time including but not limited to speech-language deficits, motor deficits, sensory deficits in any of her limbs.  The episode lasted about 15 minutes.  She went to the emergency room where a CT of her head was done which was negative for acute intracranial pathology.  She had a CTA of the head and neck as well as CT perfusion scans done which were both normal.  MRI of her brain was also done which showed small vessel changes without any acute stroke.  She was discharged for follow-up.  She is here today for neurologic evaluation and treatment.\par \par Follow-up June 16, 2023:\par This is a 62-year-old woman who presents today for possible TIA history.  She had left visual field defects.  She had a CT of the head and neck and perfusion scan that were normal MRI of the brain just showed small vessel changes without any stroke seen.  She had outpatient cardiac work-up which she states was normal.  She is here today for neurologic follow-up without any new neurologic symptoms to report.

## 2023-06-16 NOTE — PHYSICAL EXAM

## 2023-06-16 NOTE — CONSULT LETTER
[Dear  ___] : Dear  [unfilled], [Courtesy Letter:] : I had the pleasure of seeing your patient, [unfilled], in my office today. [Please see my note below.] : Please see my note below. [Consult Closing:] : Thank you very much for allowing me to participate in the care of this patient.  If you have any questions, please do not hesitate to contact me. [Sincerely,] : Sincerely, [FreeTextEntry3] : Shant Arteaga M.D., Ph.D. DPN-N\par [facility]\par Neurology at [place]\par Medical Director of Stroke Services\par [facility]\par

## 2023-06-16 NOTE — ASSESSMENT
[FreeTextEntry1] : This is a 62-year-old woman with possible TIA versus possible optic migraine type phenomenon.  In any event she had a negative work-up for TIA.  She will continue aspirin and atorvastatin.  Goal LDL should be under 70.  I will see her back in the office in 1 year for follow-up, sooner should the need arise.  She was strongly encouraged to call me in the interim with any problems, questions or concerns.

## 2023-08-14 ENCOUNTER — APPOINTMENT (OUTPATIENT)
Dept: NEUROLOGY | Facility: CLINIC | Age: 63
End: 2023-08-14

## 2023-10-09 ENCOUNTER — APPOINTMENT (OUTPATIENT)
Dept: OBGYN | Facility: CLINIC | Age: 63
End: 2023-10-09
Payer: MEDICARE

## 2023-10-09 VITALS
HEIGHT: 66 IN | WEIGHT: 192 LBS | SYSTOLIC BLOOD PRESSURE: 120 MMHG | BODY MASS INDEX: 30.86 KG/M2 | DIASTOLIC BLOOD PRESSURE: 80 MMHG

## 2023-10-09 DIAGNOSIS — M81.0 AGE-RELATED OSTEOPOROSIS W/OUT CURRENT PATHOLOGICAL FRACTURE: ICD-10-CM

## 2023-10-09 PROCEDURE — 99396 PREV VISIT EST AGE 40-64: CPT

## 2023-10-09 PROCEDURE — 81003 URINALYSIS AUTO W/O SCOPE: CPT | Mod: QW

## 2023-10-14 LAB
BILIRUB UR QL STRIP: NORMAL
CYTOLOGY CVX/VAG DOC THIN PREP: ABNORMAL
GLUCOSE UR-MCNC: NORMAL
HCG UR QL: 0.2 EU/DL
HGB UR QL STRIP.AUTO: NORMAL
HPV HIGH+LOW RISK DNA PNL CVX: NOT DETECTED
KETONES UR-MCNC: NORMAL
LEUKOCYTE ESTERASE UR QL STRIP: NORMAL
NITRITE UR QL STRIP: NORMAL
PH UR STRIP: 5
PROT UR STRIP-MCNC: NORMAL
SP GR UR STRIP: 1.02

## 2023-11-03 ENCOUNTER — APPOINTMENT (OUTPATIENT)
Dept: ANTEPARTUM | Facility: CLINIC | Age: 63
End: 2023-11-03

## 2023-11-29 ENCOUNTER — ASOB RESULT (OUTPATIENT)
Age: 63
End: 2023-11-29

## 2023-11-29 ENCOUNTER — APPOINTMENT (OUTPATIENT)
Dept: OBGYN | Facility: CLINIC | Age: 63
End: 2023-11-29
Payer: MEDICARE

## 2023-11-29 ENCOUNTER — APPOINTMENT (OUTPATIENT)
Dept: ANTEPARTUM | Facility: CLINIC | Age: 63
End: 2023-11-29
Payer: MEDICARE

## 2023-11-29 VITALS
HEIGHT: 66 IN | SYSTOLIC BLOOD PRESSURE: 140 MMHG | BODY MASS INDEX: 28.93 KG/M2 | WEIGHT: 180 LBS | DIASTOLIC BLOOD PRESSURE: 78 MMHG

## 2023-11-29 DIAGNOSIS — R39.9 UNSPECIFIED SYMPTOMS AND SIGNS INVOLVING THE GENITOURINARY SYSTEM: ICD-10-CM

## 2023-11-29 DIAGNOSIS — Z87.898 PERSONAL HISTORY OF OTHER SPECIFIED CONDITIONS: ICD-10-CM

## 2023-11-29 DIAGNOSIS — Z12.4 ENCOUNTER FOR SCREENING FOR MALIGNANT NEOPLASM OF CERVIX: ICD-10-CM

## 2023-11-29 DIAGNOSIS — Z12.11 ENCOUNTER FOR SCREENING FOR MALIGNANT NEOPLASM OF COLON: ICD-10-CM

## 2023-11-29 DIAGNOSIS — Z01.818 ENCOUNTER FOR OTHER PREPROCEDURAL EXAMINATION: ICD-10-CM

## 2023-11-29 DIAGNOSIS — Z12.39 ENCOUNTER FOR OTHER SCREENING FOR MALIGNANT NEOPLASM OF BREAST: ICD-10-CM

## 2023-11-29 LAB
APPEARANCE: CLEAR
BILIRUBIN URINE: NEGATIVE
BLOOD URINE: NEGATIVE
COLOR: YELLOW
GLUCOSE QUALITATIVE U: NEGATIVE
KETONES URINE: NEGATIVE
LEUKOCYTE ESTERASE URINE: NEGATIVE
NITRITE URINE: NEGATIVE
PH URINE: 5
PROTEIN URINE: NEGATIVE
SPECIFIC GRAVITY URINE: 1.01
UROBILINOGEN URINE: 0.2 (ref 0.2–?)

## 2023-11-29 PROCEDURE — 76857 US EXAM PELVIC LIMITED: CPT | Mod: 59

## 2023-11-29 PROCEDURE — 76830 TRANSVAGINAL US NON-OB: CPT

## 2023-11-29 PROCEDURE — 99214 OFFICE O/P EST MOD 30 MIN: CPT

## 2023-12-02 ENCOUNTER — TRANSCRIPTION ENCOUNTER (OUTPATIENT)
Age: 63
End: 2023-12-02

## 2023-12-02 PROBLEM — R39.9 URINARY TRACT INFECTION SYMPTOMS: Status: RESOLVED | Noted: 2022-09-09 | Resolved: 2023-12-02

## 2023-12-02 LAB — BACTERIA UR CULT: NORMAL

## 2024-02-02 ENCOUNTER — APPOINTMENT (OUTPATIENT)
Dept: OBGYN | Facility: CLINIC | Age: 64
End: 2024-02-02
Payer: MEDICARE

## 2024-02-02 ENCOUNTER — ASOB RESULT (OUTPATIENT)
Age: 64
End: 2024-02-02

## 2024-02-02 ENCOUNTER — APPOINTMENT (OUTPATIENT)
Dept: ANTEPARTUM | Facility: CLINIC | Age: 64
End: 2024-02-02
Payer: MEDICARE

## 2024-02-02 VITALS
BODY MASS INDEX: 28.45 KG/M2 | DIASTOLIC BLOOD PRESSURE: 76 MMHG | WEIGHT: 177 LBS | HEIGHT: 66 IN | SYSTOLIC BLOOD PRESSURE: 122 MMHG

## 2024-02-02 DIAGNOSIS — N83.209 UNSPECIFIED OVARIAN CYST, UNSPECIFIED SIDE: ICD-10-CM

## 2024-02-02 DIAGNOSIS — Z13.79 ENCOUNTER FOR OTHER SCREENING FOR GENETIC AND CHROMOSOMAL ANOMALIES: ICD-10-CM

## 2024-02-02 DIAGNOSIS — Z01.419 ENCOUNTER FOR GYNECOLOGICAL EXAMINATION (GENERAL) (ROUTINE) W/OUT ABNORMAL FINDINGS: ICD-10-CM

## 2024-02-02 DIAGNOSIS — R92.30 DENSE BREASTS, UNSPECIFIED: ICD-10-CM

## 2024-02-02 LAB
APPEARANCE: CLEAR
BILIRUBIN URINE: NEGATIVE
BLOOD URINE: NEGATIVE
COLOR: YELLOW
GLUCOSE QUALITATIVE U: NEGATIVE
KETONES URINE: ABNORMAL
LEUKOCYTE ESTERASE URINE: NEGATIVE
NITRITE URINE: NEGATIVE
PH URINE: 5
PROTEIN URINE: NEGATIVE
SPECIFIC GRAVITY URINE: >=1.03
UROBILINOGEN URINE: 0.2 (ref 0.2–?)

## 2024-02-02 PROCEDURE — 76830 TRANSVAGINAL US NON-OB: CPT

## 2024-02-02 PROCEDURE — 76857 US EXAM PELVIC LIMITED: CPT | Mod: 59

## 2024-02-02 PROCEDURE — 99214 OFFICE O/P EST MOD 30 MIN: CPT

## 2024-02-03 PROBLEM — N83.209 SIMPLE OVARIAN CYST: Status: ACTIVE | Noted: 2023-11-29

## 2024-02-03 PROBLEM — R92.30 DENSE BREASTS: Status: RESOLVED | Noted: 2018-12-28 | Resolved: 2024-02-03

## 2024-02-03 RX ORDER — OXYCODONE HYDROCHLORIDE 10 MG/1
10 TABLET, FILM COATED, EXTENDED RELEASE ORAL
Refills: 0 | Status: ACTIVE | COMMUNITY

## 2024-02-03 NOTE — REASON FOR VISIT
[Follow-Up] : a follow-up evaluation of [FreeTextEntry2] : sono review. also c/o: pressure around perineum.

## 2024-02-03 NOTE — HISTORY OF PRESENT ILLNESS
[LMP unknown] : LMP unknown [postmenopausal] : postmenopausal [N] : Patient does not use contraception [Y] : Positive pregnancy history [unknown] : Patient is unsure of the date of her LMP [Menarche Age: ____] : age at menarche was [unfilled] [No] : Patient does not have concerns regarding sex [Currently Active] : currently active [FreeTextEntry1] : LIOR 62 yo  LMP unknown is here today for a sonogram follow up secondary to pelvic pressure.  Lior is very frustrated with her other issues.  She is suffering with back pains into her leg. She had back surgery for cauda equina syndrome over a year ago. Since then she has been having severe pains in her back and leg. She has been to pain management and has had every treatment possible.   Patient c/o perirectal pain and pressure, near a previous perianal abscess which she states she had 30 years ago. It is very bothersome to her. She saw a general surgeon who just looked and said she was fine however the pressure is just like she had in the past when she had an abscess, and it is like the pressure she started having right before her emergency cauda equina syndrome. She cannot get an appointment with a colorectal specialist for 6 weeks. She is very upset by all of the pain she has been having.  Pelvic limited with TV sonogram findings reviewed and are the following: Anteverted uterus. Endometrial lining measures 2.5 mm with a trace amount of fluid seen. Rt ovary appears wnl. Lt ovary has a simple cyst measuring 4.0 cm, previously 3.1 cm. No free fluid noted. [TextBox_19] : BR2 [Mammogramdate] : 07/08/2020 [PapSmeardate] : 10/09/2023 [TextBox_31] : WNL  [BoneDensityDate] : 07/08/2020 [TextBox_37] : WNL  [ColonoscopyDate] : 13YRS AGO [TextBox_43] : WNL PER PT  [HPVDate] : 10/09/2023 [TextBox_78] : NEG  [PGxTotal] : 1 [PGHxAbortions] : 1 [[facility]] : 0

## 2024-02-03 NOTE — END OF VISIT
[FreeTextEntry3] : I, Mil Mccormack, solely acted as scribe for Dr. Nova Burden on 02/02/2024. All medical entries made by the Scribe were at my, Dr. Burden's, direction and personally dictated by me on 02/02/2024. I have reviewed the chart and agree that the record accurately reflects my personal performance of the history, physical exam, assessment and plan. I have also personally directed, reviewed, and agreed with the chart. [Time Spent: ___ minutes] : I have spent [unfilled] minutes of time on the encounter.

## 2024-02-03 NOTE — DISCUSSION/SUMMARY
[FreeTextEntry1] : Discussed sonogram findings with patient today. Discussed Lt ovary simple cyst 4.0 cm, larger compared to previous sonogram and otherwise normal sonogram. I doubt this is the source of any of her pain. Will repeat sono in 3 months.  Urine culture done today.  I explained that although I am not a colorectal doctor, I can order a CT for better evaluation of her perirectal pain and pressure. I want her to be reassured that that there may be nothing else going on. Recommended colorectal consult. If there are findings on the CT scan requiring a sooner colorectal consult, we will assist her in making an appointment.   Prescription for pelvic CT given.  Prescription for transvaginal US given.  Emotional support provided.  She will follow up in 3 months for sonogram results and annually/as needed.

## 2024-02-05 LAB — BACTERIA UR CULT: NORMAL

## 2024-02-07 ENCOUNTER — OUTPATIENT (OUTPATIENT)
Dept: OUTPATIENT SERVICES | Facility: HOSPITAL | Age: 64
LOS: 1 days | End: 2024-02-07
Payer: MEDICARE

## 2024-02-07 ENCOUNTER — RESULT REVIEW (OUTPATIENT)
Age: 64
End: 2024-02-07

## 2024-02-07 ENCOUNTER — APPOINTMENT (OUTPATIENT)
Dept: CT IMAGING | Facility: CLINIC | Age: 64
End: 2024-02-07
Payer: MEDICARE

## 2024-02-07 DIAGNOSIS — K61.0 ANAL ABSCESS: Chronic | ICD-10-CM

## 2024-02-07 DIAGNOSIS — K62.89 OTHER SPECIFIED DISEASES OF ANUS AND RECTUM: ICD-10-CM

## 2024-02-07 DIAGNOSIS — Z98.89 OTHER SPECIFIED POSTPROCEDURAL STATES: Chronic | ICD-10-CM

## 2024-02-07 PROCEDURE — 72193 CT PELVIS W/DYE: CPT | Mod: 26

## 2024-02-07 PROCEDURE — 72193 CT PELVIS W/DYE: CPT

## 2024-02-09 ENCOUNTER — NON-APPOINTMENT (OUTPATIENT)
Age: 64
End: 2024-02-09

## 2024-02-14 ENCOUNTER — NON-APPOINTMENT (OUTPATIENT)
Age: 64
End: 2024-02-14

## 2024-02-14 DIAGNOSIS — N94.9 UNSPECIFIED CONDITION ASSOCIATED WITH FEMALE GENITAL ORGANS AND MENSTRUAL CYCLE: ICD-10-CM

## 2024-02-14 DIAGNOSIS — N95.2 POSTMENOPAUSAL ATROPHIC VAGINITIS: ICD-10-CM

## 2024-02-14 RX ORDER — METRONIDAZOLE 7.5 MG/G
0.75 GEL VAGINAL
Qty: 1 | Refills: 0 | Status: ACTIVE | COMMUNITY
Start: 2024-02-14 | End: 1900-01-01

## 2024-02-14 RX ORDER — ESTRADIOL 0.1 MG/G
0.1 CREAM VAGINAL
Qty: 1 | Refills: 1 | Status: ACTIVE | COMMUNITY
Start: 2024-02-14 | End: 1900-01-01

## 2024-02-19 ENCOUNTER — APPOINTMENT (OUTPATIENT)
Dept: OBGYN | Facility: CLINIC | Age: 64
End: 2024-02-19
Payer: MEDICARE

## 2024-02-19 ENCOUNTER — NON-APPOINTMENT (OUTPATIENT)
Age: 64
End: 2024-02-19

## 2024-02-19 VITALS
HEIGHT: 66 IN | SYSTOLIC BLOOD PRESSURE: 124 MMHG | DIASTOLIC BLOOD PRESSURE: 76 MMHG | WEIGHT: 190.13 LBS | BODY MASS INDEX: 30.56 KG/M2

## 2024-02-19 DIAGNOSIS — R39.15 URGENCY OF URINATION: ICD-10-CM

## 2024-02-19 PROCEDURE — 99214 OFFICE O/P EST MOD 30 MIN: CPT

## 2024-02-22 ENCOUNTER — APPOINTMENT (OUTPATIENT)
Dept: UROGYNECOLOGY | Facility: CLINIC | Age: 64
End: 2024-02-22
Payer: MEDICARE

## 2024-02-22 VITALS — SYSTOLIC BLOOD PRESSURE: 114 MMHG | DIASTOLIC BLOOD PRESSURE: 72 MMHG

## 2024-02-22 DIAGNOSIS — R39.11 HESITANCY OF MICTURITION: ICD-10-CM

## 2024-02-22 DIAGNOSIS — N39.0 URINARY TRACT INFECTION, SITE NOT SPECIFIED: ICD-10-CM

## 2024-02-22 DIAGNOSIS — R35.0 FREQUENCY OF MICTURITION: ICD-10-CM

## 2024-02-22 PROCEDURE — 99214 OFFICE O/P EST MOD 30 MIN: CPT

## 2024-02-22 NOTE — HISTORY OF PRESENT ILLNESS
[FreeTextEntry1] : 63-year-old woman with 2-1/2-week history of urinary frequency urgency and dysuria.  She saw urologist who prescribed oxybutynin and tamsulosin.  By her history his office urine dip was negative so no culture was sent.  She saw her gynecologist a couple of days ago who also took a urine specimen which came back yesterday is positive.  It appears that the sensitivities are available today.  She has a vague background of mild discomfort in the vulva and she is familiar that this is likely due to atrophy she is recently been prescribed vaginal estrogen cream but has not started it.  She has no allergie Medical history includes back injury and thyroid   Physical examination:   The abdomen is soft nontender no masses, extremities have no pain, external genitalia show moderate atrophic changes and moderate sensitivity to touch.  The vagina has normal tone and no evidence of prolapse.  The internal of the vagina has mild atrophy normal to bimanual examination and inspection.  No specimen was obtained as a complete positive urine culture from 2 days ago is available for review in triage  Impression perimenopausal symptoms of menopause and active UTI  Recommendations: Prescription: Nitrofurantoin 1 tablet twice a day Azo over-the-counter 2 tabs 1-2 times a day for 1 to 3 days for symptoms awaiting the UTI to clear Preventative for UTI: Vitamin C 1000 mg, cranberry 400 mg twice daily, estrogen cream  I counseled the patient in detail regarding risk benefits adverse reactions related to estrogen cream.  She has a mother with a history of endometrial cancer.  Differences between oral and vaginal estrogen creams were reviewed and the relationship between estrogens and malignancy.  She will use 1 g vaginal 2 -3 times a week.  Application method was demonstrated with digital insertion not with applicator  I recommend Vaseline or petroleum to the introitus prior to void and Padma bottle after voiding to wash out residual urine droplets.  I do not believe her history is strong for overactive bladder and I recommended discontinuing oxybutynin and tamsulosin  Review of previous notes, labs, current prescriptions was performed. History , Physical counseling was performed.  All questions answered in lay language Total time for encounter was  47     min A chaperone was present for the entirety of the encounter   She will return if symptoms persist

## 2024-03-04 LAB
APPEARANCE: CLEAR
APPEARANCE: CLEAR
BACTERIA UR CULT: ABNORMAL
BACTERIA: ABNORMAL /HPF
BASOPHILS # BLD AUTO: 0.04 K/UL
BASOPHILS NFR BLD AUTO: 0.4 %
BILIRUBIN URINE: NEGATIVE
BILIRUBIN URINE: NEGATIVE
BLOOD URINE: ABNORMAL
BLOOD URINE: NEGATIVE
CANDIDA VAG CYTO: NOT DETECTED
CAST: NORMAL /LPF
COLOR: YELLOW
COLOR: YELLOW
EOSINOPHIL # BLD AUTO: 0.24 K/UL
EOSINOPHIL NFR BLD AUTO: 2.4 %
EPITHELIAL CELLS: 0 /HPF
G VAGINALIS+PREV SP MTYP VAG QL MICRO: NOT DETECTED
GLUCOSE QUALITATIVE U: NEGATIVE
GLUCOSE QUALITATIVE U: NEGATIVE MG/DL
HCT VFR BLD CALC: 35.8 %
HGB BLD-MCNC: 12.3 G/DL
IMM GRANULOCYTES NFR BLD AUTO: 0.4 %
KETONES URINE: NEGATIVE
KETONES URINE: NEGATIVE MG/DL
LEUKOCYTE ESTERASE URINE: ABNORMAL
LEUKOCYTE ESTERASE URINE: ABNORMAL
LYMPHOCYTES # BLD AUTO: 2.35 K/UL
LYMPHOCYTES NFR BLD AUTO: 23.2 %
MAN DIFF?: NORMAL
MCHC RBC-ENTMCNC: 32.4 PG
MCHC RBC-ENTMCNC: 34.4 GM/DL
MCV RBC AUTO: 94.2 FL
MICROSCOPIC-UA: NORMAL
MONOCYTES # BLD AUTO: 0.61 K/UL
MONOCYTES NFR BLD AUTO: 6 %
NEUTROPHILS # BLD AUTO: 6.85 K/UL
NEUTROPHILS NFR BLD AUTO: 67.6 %
NITRITE URINE: NEGATIVE
NITRITE URINE: NEGATIVE
PH URINE: 6.5
PH URINE: 7
PLATELET # BLD AUTO: 243 K/UL
PROTEIN URINE: 30
PROTEIN URINE: 30 MG/DL
RBC # BLD: 3.8 M/UL
RBC # FLD: 13 %
RED BLOOD CELLS URINE: NORMAL /HPF
SPECIFIC GRAVITY URINE: 1.02
SPECIFIC GRAVITY URINE: 1.02
T VAGINALIS VAG QL WET PREP: NOT DETECTED
UROBILINOGEN URINE: 0.2 (ref 0.2–?)
UROBILINOGEN URINE: 0.2 MG/DL
WBC # FLD AUTO: 10.13 K/UL
WHITE BLOOD CELLS URINE: 181 /HPF

## 2024-03-13 ENCOUNTER — APPOINTMENT (OUTPATIENT)
Dept: UROGYNECOLOGY | Facility: CLINIC | Age: 64
End: 2024-03-13

## 2024-03-13 ENCOUNTER — APPOINTMENT (OUTPATIENT)
Dept: UROGYNECOLOGY | Facility: CLINIC | Age: 64
End: 2024-03-13
Payer: MEDICARE

## 2024-03-13 VITALS
HEART RATE: 79 BPM | BODY MASS INDEX: 28.93 KG/M2 | DIASTOLIC BLOOD PRESSURE: 82 MMHG | SYSTOLIC BLOOD PRESSURE: 147 MMHG | WEIGHT: 180 LBS | HEIGHT: 66 IN

## 2024-03-13 LAB
BILIRUB UR QL STRIP: NEGATIVE
GLUCOSE UR-MCNC: NEGATIVE
HCG UR QL: 0.2 EU/DL
HGB UR QL STRIP.AUTO: NEGATIVE
KETONES UR-MCNC: NEGATIVE
LEUKOCYTE ESTERASE UR QL STRIP: NORMAL
NITRITE UR QL STRIP: NEGATIVE
PH UR STRIP: 6
PROT UR STRIP-MCNC: NEGATIVE
SP GR UR STRIP: 1.02

## 2024-03-13 PROCEDURE — 81003 URINALYSIS AUTO W/O SCOPE: CPT | Mod: QW

## 2024-03-13 PROCEDURE — 99214 OFFICE O/P EST MOD 30 MIN: CPT | Mod: 25

## 2024-03-13 PROCEDURE — 51701 INSERT BLADDER CATHETER: CPT | Mod: 59

## 2024-03-13 PROCEDURE — 99459 PELVIC EXAMINATION: CPT

## 2024-03-13 NOTE — ASSESSMENT
[FreeTextEntry1] : LIOR is a 63 year female who presents for UTI last month and atrophy. On exam, negative CST, normal PVR, no POP, +atrophy c/w GUSM.  We discussed the diagnosis of a UTI vs recurrent UTIs with symptoms as well as +UCx. We discussed providing a urine specimen to be tested with UTI suspected, possibly via cath to avoid contamination. We discussed potential evaluation with renal tract imaging and cystourethroscopy to eval for path such as stone or malignancy. We dissused prevention strategies including daily vs post-coital low-dose abx, topical vaginal estrogen if postmenopausal, acidifying urine with cranberry or vit C, methenamine, probiotic use, d-mannose. Risks, benefits; side effects of the meds discussed. If UTI returns, she can RTO to given specimen and we will follow, consider eval, consider prophyl if recurrent. She is having lumbar spinal surg soon and will likely have a cath x 24 hours after. We discussed lack of data to support abx while a cath is in to prevent UTI, however a short-course can be considered upon cath removal. All ques answered, continue the vaginal estrace - Vaginal estrogen side effects such as breast tenderness or vaginal spotting, rare risks such as MI / stroke / VTE / estrogen-dependent cancer discussed and she would like continue. All ques answered, RTO prn.  Plan: [] Ua, cx [] Continue estrace   All questions answered.

## 2024-03-13 NOTE — OB HISTORY
[Definite ___ (Date)] : the last menstrual period was [unfilled] [Last Pap Smear ___] : date of last pap smear was on [unfilled] [Taking Estrogens] : taking estrogen replacement [Sexually Active] : is not sexually active [Abnormal Pap Smear] : normal pap smear

## 2024-03-13 NOTE — PHYSICAL EXAM
[Chaperone Present] : A chaperone was present in the examining room during all aspects of the physical examination [No Acute Distress] : in no acute distress [Well developed] : well developed [Oriented x3] : oriented to person, place, and time [Well Nourished] : ~L well nourished [No Edema] : ~T edema was not present [Warm and Dry] : was warm and dry to touch [Vulvar Atrophy] : vulvar atrophy [Labia Majora] : were normal [Labia Minora] : were normal [Atrophy] : atrophy [Soft] :  the cervix was soft [Normal] : no abnormalities [Uterine Adnexae] : were not tender and not enlarged [Post Void Residual ____ml] : post void residual was [unfilled] ml [Tenderness] : ~T no ~M abdominal tenderness observed [Cough] : no cough [Distended] : not distended [None] : no CVA tenderness [No Bleeding] : there was no active vaginal bleeding [2] : 2 [Aa ____] : Aa [unfilled] [C ____] : C [unfilled] [GH ____] : GH [unfilled] [PB ____] : PB [unfilled] [Ap ____] : Ap [unfilled] [TVL ____] : TVL  [unfilled] [] : 0 [Exam Deferred] : was deferred [de-identified] : no POP [FreeTextEntry3] : neg CST

## 2024-03-13 NOTE — PROCEDURE
[Straight Catheterization] : insertion of a straight catheter [Patient] : the patient [Urinary Tract Infection] : a urinary tract infection [None] : there were no complications with the catheter insertion [___ Fr Straight Tip] : a [unfilled] in [place] straight tip catheter [Clear] : clear [Tolerated Well] : the patient tolerated the procedure well [No Complications] : no complications [Post procedure instructions and information given] : Post procedure instructions and information were given and reviewed with patient. [0] : 0

## 2024-03-13 NOTE — REASON FOR VISIT
[Initial Visit ___] : an initial visit for [unfilled] [Questionnaire Received] : Patient questionnaire received [Intake Form Reviewed] : Patient intake form with past medical history, surgical history, family history and social history reviewed today [Painful Urination] : painful urination [Recurrent Urinary Infections] : recurrent urinary infections

## 2024-03-13 NOTE — ADDENDUM
[FreeTextEntry1] : This note was written by An Mendieta, acting as the  for Dr. Mancilla. This note accurately reflects the work and decisions made by Dr. Mancilla.

## 2024-03-13 NOTE — HISTORY OF PRESENT ILLNESS
[FreeTextEntry1] : LIOR is a 63 year female who presents for UTI. Seen by gyn and uro for UTI symptoms. Uro found negative culture, diagnosed her with OAB and prescribed Oxybutynin and Tamsulosin. Reports bladder pain persisted. Denies retention. Uses la nena bottle following voids. Reports internal burning only following void. Denies dysuria. UTI symptoms have since resolved after 5 days of Macrobid. Reports catheter following back surgery years ago. Plans to undergo additional back surgery soon and is concerned about having catheter again. Currently on steroids today for neck, inquiring if this can affect urinary frequency. Denies h/o UTIs prior to this episode.    She was referred by Dr. Dimas, seen 02/22/2024. Started on Estrace, vit C 1000mg, cranberry 400mg twice daily, Azo, Nitrofurantoin.   CT Pelvis 02/07/2024 - No definitive evidence of perirectal abscess or definable fistula on the CT scan. 3.8 x 2.7 cm postmenopausal left ovarian cyst. If this is not been documented in the past few gland evaluation pelvic sonogram recommended.   Positive Urine Culture 02/19/2024, 50,000 - 99,000 CFU/mL Escherichia coli - treated with Nitrofurantoin  Ua with micro 02/19/2024 - none seen RBCs, trace blood, moderate leukocyte esterase, 30 mg/dL protein, 181 WBCs, occasional bacteria   GYN: Negative pap 10/09/2023, Negative HPV testing, LMP 2005, Vaginal estrogen use PMH: Back injury, Thyroid  PSH: Laminectomy, Lipoma, Cholecystectomy  Meds: Levothyroxine, Gabapentin, Oxycontin, Atorvastatin, Low dose Aspirin NKDA    Daytime frequency: Yes  Urinary urgency: Yes  History of frequent urinary tract infections: Yes  Previous treatment: Oxybutynin, Tamsulosin, Azo, Nitrofurantoin, Amoxicillin

## 2024-03-14 LAB
APPEARANCE: CLEAR
BACTERIA: NEGATIVE /HPF
BILIRUBIN URINE: NEGATIVE
BLOOD URINE: NEGATIVE
CAST: 1 /LPF
COLOR: YELLOW
EPITHELIAL CELLS: 0 /HPF
GLUCOSE QUALITATIVE U: NEGATIVE MG/DL
KETONES URINE: NEGATIVE MG/DL
LEUKOCYTE ESTERASE URINE: NEGATIVE
MICROSCOPIC-UA: NORMAL
NITRITE URINE: NEGATIVE
PH URINE: 6.5
PROTEIN URINE: NEGATIVE MG/DL
RED BLOOD CELLS URINE: 0 /HPF
REVIEW: NORMAL
SPECIFIC GRAVITY URINE: 1.01
UROBILINOGEN URINE: 0.2 MG/DL
WHITE BLOOD CELLS URINE: 0 /HPF

## 2024-03-15 LAB — BACTERIA UR CULT: NORMAL

## 2024-03-21 NOTE — PHYSICAL EXAM
[Chaperone Present] : A chaperone was present in the examining room during all aspects of the physical examination [Appropriately responsive] : appropriately responsive [Alert] : alert [No Acute Distress] : no acute distress [Labia Minora] : normal [Labia Majora] : normal [Normal] : normal [Uterine Adnexae] : normal

## 2024-03-21 NOTE — PLAN
[FreeTextEntry1] : fu uc fu urologist fu vaginal cultures-pelvic exam negative no sxs gyn abnormality- suspect urological issues- fu uro gyn and urology warning signs for immediate attention reviewed 40 min spent face to face

## 2024-03-21 NOTE — HISTORY OF PRESENT ILLNESS
[Patient reported PAP Smear was normal] : Patient reported PAP Smear was normal [postmenopausal] : postmenopausal [N] : Patient is not sexually active [Y] : Positive pregnancy history [Menarche Age: ____] : age at menarche was [unfilled] [No] : Patient does not have concerns regarding sex [Previously active] : previously active [Men] : men [Mammogramdate] : 07/08/2020 [TextBox_19] : BR2 [PapSmeardate] : 10/09/2023 [BoneDensityDate] : 07/08/2020 [TextBox_37] : WNL [ColonoscopyDate] : 2011 [TextBox_43] : WNL PER PT  [GonorrheaDate] : 09/09/2022 [TextBox_63] : NEG [ChlamydiaDate] : 09/09/2022 [TextBox_68] : NEG [HPVDate] : 10/09/2023 [TextBox_78] : NEG [LMPDate] : 2005 [PGxTotal] : 1 [[facility]] : 0 [PGHxABSpont] : 1 [FreeTextEntry1] : 2005

## 2024-03-21 NOTE — REASON FOR VISIT
[Follow-Up] : a follow-up evaluation of [FreeTextEntry2] : POSSIBLE INFECTION, PPRESSURE/BURNING AFTER URINATION, BURNING SENSATION WHILE PEEING

## 2024-03-28 LAB
APPEARANCE: CLEAR
BACTERIA: NEGATIVE /HPF
BILIRUBIN URINE: NEGATIVE
BLOOD URINE: NEGATIVE
CAST: 0 /LPF
COLOR: NORMAL
EPITHELIAL CELLS: 11 /HPF
GLUCOSE QUALITATIVE U: NEGATIVE MG/DL
KETONES URINE: NEGATIVE MG/DL
LEUKOCYTE ESTERASE URINE: ABNORMAL
MICROSCOPIC-UA: NORMAL
NITRITE URINE: NEGATIVE
PH URINE: 5.5
PROTEIN URINE: NEGATIVE MG/DL
RED BLOOD CELLS URINE: 0 /HPF
SPECIFIC GRAVITY URINE: 1.02
UROBILINOGEN URINE: 0.2 MG/DL
WHITE BLOOD CELLS URINE: 6 /HPF

## 2024-03-29 LAB — BACTERIA UR CULT: NORMAL

## 2024-04-01 ENCOUNTER — NON-APPOINTMENT (OUTPATIENT)
Age: 64
End: 2024-04-01

## 2024-04-04 ENCOUNTER — NON-APPOINTMENT (OUTPATIENT)
Age: 64
End: 2024-04-04

## 2024-04-04 ENCOUNTER — APPOINTMENT (OUTPATIENT)
Dept: OBGYN | Facility: CLINIC | Age: 64
End: 2024-04-04
Payer: MEDICARE

## 2024-04-04 PROCEDURE — ZZZZZ: CPT

## 2024-04-09 ENCOUNTER — APPOINTMENT (OUTPATIENT)
Dept: OBGYN | Facility: CLINIC | Age: 64
End: 2024-04-09
Payer: MEDICARE

## 2024-04-09 VITALS
OXYGEN SATURATION: 99 % | HEIGHT: 66 IN | HEART RATE: 80 BPM | TEMPERATURE: 98.2 F | DIASTOLIC BLOOD PRESSURE: 90 MMHG | RESPIRATION RATE: 16 BRPM | WEIGHT: 187 LBS | SYSTOLIC BLOOD PRESSURE: 132 MMHG | BODY MASS INDEX: 30.05 KG/M2

## 2024-04-09 DIAGNOSIS — K62.89 OTHER SPECIFIED DISEASES OF ANUS AND RECTUM: ICD-10-CM

## 2024-04-09 DIAGNOSIS — N95.0 POSTMENOPAUSAL BLEEDING: ICD-10-CM

## 2024-04-09 DIAGNOSIS — Z80.51 FAMILY HISTORY OF MALIGNANT NEOPLASM OF KIDNEY: ICD-10-CM

## 2024-04-09 DIAGNOSIS — N95.2 POSTMENOPAUSAL ATROPHIC VAGINITIS: ICD-10-CM

## 2024-04-09 LAB
HCG UR QL: NEGATIVE
QUALITY CONTROL: YES

## 2024-04-09 PROCEDURE — 58558Z: CUSTOM

## 2024-04-09 PROCEDURE — 81025 URINE PREGNANCY TEST: CPT

## 2024-04-09 RX ORDER — NITROFURANTOIN (MONOHYDRATE/MACROCRYSTALS) 25; 75 MG/1; MG/1
100 CAPSULE ORAL TWICE DAILY
Qty: 10 | Refills: 0 | Status: DISCONTINUED | COMMUNITY
Start: 2024-02-22 | End: 2024-04-09

## 2024-04-09 NOTE — DISCUSSION/SUMMARY
[FreeTextEntry1] : D&C Hysteroscopy and EMB done today.   Normal small atrophic cavity noted.   F/u EMB results.  F/u in 2 weeks and as needed.

## 2024-04-09 NOTE — END OF VISIT
[FreeTextEntry3] : I, Mil Mccormack, solely acted as scribe for Dr. Nova Burden on 04/09/2024. All medical entries made by the Scribe were at my, Dr. Burden's, direction and personally dictated by me on 04/09/2024. I have reviewed the chart and agree that the record accurately reflects my personal performance of the history, physical exam, assessment and plan. I have also personally directed, reviewed, and agreed with the chart.

## 2024-04-09 NOTE — PHYSICAL EXAM
[Chaperone Present] : A chaperone was present in the examining room during all aspects of the physical examination [Appropriately responsive] : appropriately responsive [Alert] : alert [No Acute Distress] : no acute distress [Oriented x3] : oriented x3 [39352] : A chaperone was present during the pelvic exam.

## 2024-04-09 NOTE — HISTORY OF PRESENT ILLNESS
[postmenopausal] : postmenopausal [N] : Patient is not sexually active [Y] : Positive pregnancy history [Menarche Age: ____] : age at menarche was [unfilled] [No] : Patient does not have concerns regarding sex [Previously active] : previously active [Mammogramdate] : 07/08/2020 [TextBox_19] : BR2 [PapSmeardate] : 10/09/2023 [TextBox_31] : NEGATIVE [HPVDate] : 10/09/2023 [TextBox_78] : NEGATIVE [LMPDate] : 2005 [PGxTotal] : 1 [[facility]] : 0 [PGHxABSpont] : 1 [FreeTextEntry1] : 2005

## 2024-04-10 ENCOUNTER — NON-APPOINTMENT (OUTPATIENT)
Age: 64
End: 2024-04-10

## 2024-04-12 ENCOUNTER — NON-APPOINTMENT (OUTPATIENT)
Age: 64
End: 2024-04-12

## 2024-04-12 LAB — CORE LAB BIOPSY: NORMAL

## 2024-04-24 ENCOUNTER — APPOINTMENT (OUTPATIENT)
Dept: OBGYN | Facility: CLINIC | Age: 64
End: 2024-04-24

## 2024-05-02 ENCOUNTER — APPOINTMENT (OUTPATIENT)
Dept: ANTEPARTUM | Facility: CLINIC | Age: 64
End: 2024-05-02

## 2024-05-02 ENCOUNTER — APPOINTMENT (OUTPATIENT)
Dept: OBGYN | Facility: CLINIC | Age: 64
End: 2024-05-02

## 2024-06-10 ENCOUNTER — APPOINTMENT (OUTPATIENT)
Dept: NEUROLOGY | Facility: CLINIC | Age: 64
End: 2024-06-10

## 2024-09-30 ENCOUNTER — APPOINTMENT (OUTPATIENT)
Dept: OBGYN | Facility: CLINIC | Age: 64
End: 2024-09-30
Payer: MEDICARE

## 2024-09-30 VITALS
DIASTOLIC BLOOD PRESSURE: 84 MMHG | SYSTOLIC BLOOD PRESSURE: 110 MMHG | BODY MASS INDEX: 28.12 KG/M2 | WEIGHT: 175 LBS | HEIGHT: 66 IN

## 2024-09-30 DIAGNOSIS — R35.0 FREQUENCY OF MICTURITION: ICD-10-CM

## 2024-09-30 DIAGNOSIS — N39.0 URINARY TRACT INFECTION, SITE NOT SPECIFIED: ICD-10-CM

## 2024-09-30 DIAGNOSIS — Z12.31 ENCOUNTER FOR SCREENING MAMMOGRAM FOR MALIGNANT NEOPLASM OF BREAST: ICD-10-CM

## 2024-09-30 PROCEDURE — 99213 OFFICE O/P EST LOW 20 MIN: CPT

## 2024-09-30 PROCEDURE — 99459 PELVIC EXAMINATION: CPT

## 2024-10-02 LAB — BACTERIA UR CULT: NORMAL

## 2024-10-04 NOTE — PLAN
[FreeTextEntry1] : UCX sent out to r/o a UTI.   Prescription for mammogram screening given.  F/u annually or as needed.

## 2024-10-04 NOTE — HISTORY OF PRESENT ILLNESS
[Patient reported bone density results were normal] : Patient reported bone density results were normal [N] : Patient does not use contraception [Y] : Positive pregnancy history [Menarche Age: ____] : age at menarche was [unfilled] [Menopause Age: ____] : age at menopause was [unfilled] [No] : Patient does not have concerns regarding sex [Previously active] : previously active [Men] : men [Mammogramdate] : 07/08/2020 [TextBox_19] : BR2 [PapSmeardate] : 10/09/2023 [TextBox_31] : neg [BoneDensityDate] : 07/08/2020 [TextBox_37] : NORMAL [HPVDate] : 10/09/2023 [TextBox_78] : neg [LMPDate] : 2005 [PGxTotal] : 1 [[facility]] : 0 [PGHxABSpont] : 1 [FreeTextEntry1] : 2005

## 2024-10-04 NOTE — HISTORY OF PRESENT ILLNESS
[Patient reported bone density results were normal] : Patient reported bone density results were normal [N] : Patient does not use contraception [Y] : Positive pregnancy history [Menarche Age: ____] : age at menarche was [unfilled] [Menopause Age: ____] : age at menopause was [unfilled] [No] : Patient does not have concerns regarding sex [Previously active] : previously active [Men] : men [Mammogramdate] : 07/08/2020 [TextBox_19] : BR2 [PapSmeardate] : 10/09/2023 [TextBox_31] : neg [BoneDensityDate] : 07/08/2020 [TextBox_37] : NORMAL [HPVDate] : 10/09/2023 [TextBox_78] : neg [LMPDate] : 2005 [PGxTotal] : 1 [[facility]] : 0 [PGHxABSpont] : 1 [FreeTextEntry1] : 2005

## 2024-10-04 NOTE — PHYSICAL EXAM
[Chaperone Present] : A chaperone was present in the examining room during all aspects of the physical examination [11368] : A chaperone was present during the pelvic exam. [Appropriately responsive] : appropriately responsive [Alert] : alert [No Acute Distress] : no acute distress [Oriented x3] : oriented x3 [Labia Majora] : normal [Labia Minora] : normal [No Bleeding] : There was no active vaginal bleeding [Normal] : normal [Uterine Adnexae] : non-palpable

## 2024-10-04 NOTE — PHYSICAL EXAM
[Chaperone Present] : A chaperone was present in the examining room during all aspects of the physical examination [20021] : A chaperone was present during the pelvic exam. [Appropriately responsive] : appropriately responsive [Alert] : alert [No Acute Distress] : no acute distress [Oriented x3] : oriented x3 [Labia Majora] : normal [Labia Minora] : normal [No Bleeding] : There was no active vaginal bleeding [Normal] : normal [Uterine Adnexae] : non-palpable

## 2024-10-04 NOTE — END OF VISIT
[FreeTextEntry3] : I, Pippa Perez solely acted as scribe for Dr. Nova Burden on 09/30/2024  All medical entries made by the Scribe were at my, Dr. Fuentes, direction and personally dictated by me on 09/30/2024 . I have reviewed the chart and agree that the record accurately reflects my personal performance of the history, physical exam, assessment and plan. I have also personally directed, reviewed, and agreed with the chart.

## 2024-10-08 ENCOUNTER — APPOINTMENT (OUTPATIENT)
Dept: OBGYN | Facility: CLINIC | Age: 64
End: 2024-10-08
Payer: MEDICARE

## 2024-10-08 PROCEDURE — 81002 URINALYSIS NONAUTO W/O SCOPE: CPT

## 2024-10-10 LAB — BACTERIA UR CULT: NORMAL

## 2024-10-18 ENCOUNTER — EMERGENCY (EMERGENCY)
Facility: HOSPITAL | Age: 64
LOS: 1 days | Discharge: HOME | End: 2024-10-18
Attending: EMERGENCY MEDICINE
Payer: MEDICARE

## 2024-10-18 VITALS
HEIGHT: 66 IN | SYSTOLIC BLOOD PRESSURE: 136 MMHG | HEART RATE: 80 BPM | OXYGEN SATURATION: 97 % | RESPIRATION RATE: 16 BRPM | TEMPERATURE: 98 F | DIASTOLIC BLOOD PRESSURE: 81 MMHG | WEIGHT: 183.42 LBS

## 2024-10-18 DIAGNOSIS — Z98.89 OTHER SPECIFIED POSTPROCEDURAL STATES: Chronic | ICD-10-CM

## 2024-10-18 DIAGNOSIS — K61.0 ANAL ABSCESS: Chronic | ICD-10-CM

## 2024-10-18 PROCEDURE — 99283 EMERGENCY DEPT VISIT LOW MDM: CPT

## 2024-10-18 PROCEDURE — 99284 EMERGENCY DEPT VISIT MOD MDM: CPT

## 2024-10-18 RX ORDER — OFLOXACIN 3 MG/ML
1 SOLUTION/ DROPS OPHTHALMIC ONCE
Refills: 0 | Status: COMPLETED | OUTPATIENT
Start: 2024-10-18 | End: 2024-10-18

## 2024-10-18 RX ADMIN — OFLOXACIN 1 DROP(S): 3 SOLUTION/ DROPS OPHTHALMIC at 18:50

## 2024-10-18 NOTE — ED PROVIDER NOTE - PATIENT PORTAL LINK FT
You can access the FollowMyHealth Patient Portal offered by [facility] by registering at the following website: [email or web address]. By joining [facility]’s FollowMyHealth portal, you will also be able to view your health information using other applications (apps) compatible with our system.

## 2024-10-18 NOTE — ED PROVIDER NOTE - NSFOLLOWUPINSTRUCTIONS_ED_ALL_ED_FT
- Return to the ED for any new or worsening symptoms.   - Use eye drops as directed 1-2 drops to each eye, every 6 hours for 1 week  - Follow-up with ophthalmologist provided within 1 week for further evaluation    Conjunctivitis    Conjunctivitis is an inflammation of the clear membrane that covers the white part of your eye and the inner surface of your eyelid (conjunctiva). Symptoms include eye redness, eye pain, tearing and drainage, crusting of eyelids, swollen eyelids, and light sensitivity. Conjunctivitis may be contagious and easily spread from person to person. It can be caused by a virus, bacteria, or as part of an allergic reaction; the treatment depends on the type of conjunctivitis suspected. Avoid touching or rubbing your eyes and wipe away any drainage gently with a warm wet washcloth. Do not wear contact lenses until the inflammation is gone – wear glasses until your health care provider says it is safe to wear contact again. Do not share towels or washcloths that may spread the infection and wash your hands frequently.    SEEK IMMEDIATE MEDICAL CARE IF YOU HAVE ANY OF THE FOLLOWING SYMPTOMS: increasing pain, blurry vision, blindness, fever, or facial pain/redness/swelling.

## 2024-10-18 NOTE — ED PROVIDER NOTE - OBJECTIVE STATEMENT
63yo F PMHx hypothyroidism, hld presents to ED c/o b/l eye irritation, itching x ~2 weeks. States 2 weeks ago she accidentally got OTC retinol skin serum into right eye which caused burning sensation and was having slight blurry vision from right eye. States she rinsed eyes but was unable to f/u with ophthalmologist Dr. Ko until 5 days ago Monday. States she had thorough eye exam and was told blurry vision was due to "immature cataract" but states she has had this for several months and never had blurry vision prior to exposure. States she was told to use poly-reyes-dex drops she already had at home which she has been using without improvement. States she keeps rubbing both eyes due to itching and is now concerned both eyes are infected. Denies new visual changes, headache, eye pain, eye FB, dizziness, purulent drainage, lid crusting. 65yo F PMHx hypothyroidism, hld presents to ED c/o b/l eye irritation, itching x ~2 weeks. States 2 weeks ago she accidentally got OTC retinol skin serum into right eye which caused burning sensation and was having slight blurry vision from right eye. States she rinsed eyes but was unable to f/u with ophthalmologist Dr. Ko until 5 days ago Monday. States she had thorough eye exam and was told blurry vision was due to "immature cataract" but states she has had this for several months and never had blurry vision prior to exposure. States she was told to use poly-reyes-dex drops she already had at home which she has been using without improvement. States she keeps rubbing both eyes due to itching and is now concerned both eyes are infected. Denies new visual changes, headache, eye pain, eye FB, dizziness, purulent drainage, lid crusting. Does not wear contacts.

## 2024-10-18 NOTE — ED PROVIDER NOTE - PHYSICAL EXAMINATION
Gen: No acute distress, non toxic  Head: NCAT  Eyes: mildly injected sclera b/l with inflamed palpebral conjunctiva b/l. no discharge or lid crusting. EOMI, PERRL  Neuro: A&O x 3, sensorimotor intact without deficits   MSK: No spine or joint tenderness to palpation, Full ROM ext x 4  Skin: No rashes. intact and perfused.

## 2024-10-18 NOTE — ED PROVIDER NOTE - ATTENDING APP SHARED VISIT CONTRIBUTION OF CARE
64-year-old female presents with bilateral eye irritation and itching x 2 weeks.  Patient reports using over-the-counter retinol skin cream that caused irritation with burning sensation.  Patient has seen ophthalmologist,  who report that she has "immature cataracts ".  She was told to use poly-reyes-dex drops drops.  no foreign body seen on exam, PERRHE YOUNG.  Ofloxacin drops given.  Recommend OTC antihistamine eyedrop for itchiness.  Patient will need to follow-up with ophthalmology.

## 2024-10-18 NOTE — ED ADULT TRIAGE NOTE - CHIEF COMPLAINT QUOTE
A&O x 4 c/o BL eye irritation.  Pt prescribed  neomycin-polymyxin-dexamethasone drop for R eye x 1 week ago, and now both eyes are itchy.

## 2024-10-18 NOTE — ED PROVIDER NOTE - CARE PROVIDER_API CALL
Everette Mcdonald  Ophthalmology  [address] [place] [address], [address]  [place] [address]  Phone: [phone]  Fax: [phone]  Follow Up Time:    0

## 2024-10-18 NOTE — ED PROVIDER NOTE - CLINICAL SUMMARY MEDICAL DECISION MAKING FREE TEXT BOX
65yo F PMHx hypothyroidism, hld presents to ED c/o b/l eye irritation, itching x ~2 weeks. States 2 weeks ago she accidentally got OTC retinol skin serum into right eye which caused burning sensation and was having slight blurry vision from right eye. States she rinsed eyes but was unable to f/u with ophthalmologist Dr. Ko until 5 days ago Monday. States she had thorough eye exam and was told blurry vision was due to "immature cataract" but states she has had this for several months and never had blurry vision prior to exposure. States she was told to use poly-reyes-dex drops she already had at home which she has been using without improvement. States she keeps rubbing both eyes due to itching and is now concerned both eyes are infected. Exam consistent with mild conjunctivitis b/l. Plan to tx with ocuflox drops and advised to use OTC anti-histamine eye drops or lubricant drops prn for itching. provided f/u information for ophthalmologist.

## 2024-10-29 ENCOUNTER — NON-APPOINTMENT (OUTPATIENT)
Age: 64
End: 2024-10-29

## 2024-10-29 DIAGNOSIS — R92.30 DENSE BREASTS, UNSPECIFIED: ICD-10-CM

## 2024-12-17 ENCOUNTER — APPOINTMENT (OUTPATIENT)
Dept: OBGYN | Facility: CLINIC | Age: 64
End: 2024-12-17
Payer: MEDICARE

## 2024-12-17 VITALS
HEIGHT: 66 IN | WEIGHT: 175 LBS | DIASTOLIC BLOOD PRESSURE: 70 MMHG | SYSTOLIC BLOOD PRESSURE: 122 MMHG | BODY MASS INDEX: 28.12 KG/M2

## 2024-12-17 DIAGNOSIS — Z01.411 ENCOUNTER FOR GYNECOLOGICAL EXAMINATION (GENERAL) (ROUTINE) WITH ABNORMAL FINDINGS: ICD-10-CM

## 2024-12-17 DIAGNOSIS — R92.30 DENSE BREASTS, UNSPECIFIED: ICD-10-CM

## 2024-12-17 DIAGNOSIS — Z12.31 ENCOUNTER FOR SCREENING MAMMOGRAM FOR MALIGNANT NEOPLASM OF BREAST: ICD-10-CM

## 2024-12-17 DIAGNOSIS — N39.0 URINARY TRACT INFECTION, SITE NOT SPECIFIED: ICD-10-CM

## 2024-12-17 DIAGNOSIS — N83.209 UNSPECIFIED OVARIAN CYST, UNSPECIFIED SIDE: ICD-10-CM

## 2024-12-17 DIAGNOSIS — Z01.419 ENCOUNTER FOR GYNECOLOGICAL EXAMINATION (GENERAL) (ROUTINE) W/OUT ABNORMAL FINDINGS: ICD-10-CM

## 2024-12-17 DIAGNOSIS — Z87.42 PERSONAL HISTORY OF OTHER DISEASES OF THE FEMALE GENITAL TRACT: ICD-10-CM

## 2024-12-17 PROCEDURE — 99396 PREV VISIT EST AGE 40-64: CPT

## 2024-12-17 PROCEDURE — 99459 PELVIC EXAMINATION: CPT

## 2024-12-31 ENCOUNTER — NON-APPOINTMENT (OUTPATIENT)
Age: 64
End: 2024-12-31

## 2025-01-02 ENCOUNTER — NON-APPOINTMENT (OUTPATIENT)
Age: 65
End: 2025-01-02

## 2025-01-06 ENCOUNTER — APPOINTMENT (OUTPATIENT)
Dept: SURGICAL ONCOLOGY | Facility: CLINIC | Age: 65
End: 2025-01-06

## 2025-03-18 ENCOUNTER — NON-APPOINTMENT (OUTPATIENT)
Age: 65
End: 2025-03-18

## 2025-03-19 ENCOUNTER — APPOINTMENT (OUTPATIENT)
Dept: OBGYN | Facility: CLINIC | Age: 65
End: 2025-03-19
Payer: MEDICARE

## 2025-03-19 VITALS
HEIGHT: 66 IN | DIASTOLIC BLOOD PRESSURE: 74 MMHG | SYSTOLIC BLOOD PRESSURE: 126 MMHG | BODY MASS INDEX: 28.93 KG/M2 | WEIGHT: 180 LBS

## 2025-03-19 DIAGNOSIS — R30.0 DYSURIA: ICD-10-CM

## 2025-03-19 PROCEDURE — 99213 OFFICE O/P EST LOW 20 MIN: CPT

## 2025-03-19 RX ORDER — NITROFURANTOIN (MONOHYDRATE/MACROCRYSTALS) 25; 75 MG/1; MG/1
100 CAPSULE ORAL TWICE DAILY
Qty: 10 | Refills: 0 | Status: ACTIVE | COMMUNITY
Start: 2025-03-19 | End: 1900-01-01

## 2025-03-23 LAB
APPEARANCE: CLEAR
BACTERIA UR CULT: NORMAL
BILIRUBIN URINE: NEGATIVE
BLOOD URINE: NEGATIVE
COLOR: YELLOW
GLUCOSE QUALITATIVE U: NEGATIVE
KETONES URINE: NEGATIVE
LEUKOCYTE ESTERASE URINE: NEGATIVE
NITRITE URINE: NEGATIVE
PH URINE: 5.5
PROTEIN URINE: NEGATIVE
SPECIFIC GRAVITY URINE: 1.01
UROBILINOGEN URINE: 0.2 (ref 0.2–?)